# Patient Record
Sex: FEMALE | Race: WHITE | NOT HISPANIC OR LATINO | Employment: FULL TIME | URBAN - METROPOLITAN AREA
[De-identification: names, ages, dates, MRNs, and addresses within clinical notes are randomized per-mention and may not be internally consistent; named-entity substitution may affect disease eponyms.]

---

## 2017-07-07 ENCOUNTER — OFFICE VISIT (OUTPATIENT)
Dept: FAMILY MEDICINE | Facility: CLINIC | Age: 47
End: 2017-07-07

## 2017-07-07 VITALS
SYSTOLIC BLOOD PRESSURE: 112 MMHG | OXYGEN SATURATION: 100 % | HEART RATE: 58 BPM | TEMPERATURE: 97.7 F | BODY MASS INDEX: 21.63 KG/M2 | RESPIRATION RATE: 18 BRPM | DIASTOLIC BLOOD PRESSURE: 77 MMHG | WEIGHT: 132 LBS

## 2017-07-07 DIAGNOSIS — Z12.4 SCREENING FOR CERVICAL CANCER: ICD-10-CM

## 2017-07-07 DIAGNOSIS — R53.83 OTHER FATIGUE: ICD-10-CM

## 2017-07-07 DIAGNOSIS — Z00.00 ROUTINE GENERAL MEDICAL EXAMINATION AT A HEALTH CARE FACILITY: ICD-10-CM

## 2017-07-07 DIAGNOSIS — R35.89 POLYURIA: Primary | ICD-10-CM

## 2017-07-07 DIAGNOSIS — N39.0 URINARY TRACT INFECTION, SITE UNSPECIFIED: ICD-10-CM

## 2017-07-07 LAB
BACTERIA: NORMAL
BACTERIA: NORMAL
BILIRUBIN UR: NEGATIVE
BLOOD UR: ABNORMAL
CASTS: NORMAL /LPF
CHOLEST SERPL-MCNC: 226.1 MG/DL (ref 0–200)
CHOLEST/HDLC SERPL: 3.4 {RATIO} (ref 0–5)
CLUE CELLS: NORMAL
CRYSTAL URINE: NORMAL /LPF
EPITHELIAL CELLS UR: <2 /LPF (ref 0–2)
GLUCOSE URINE: NEGATIVE
HDLC SERPL-MCNC: 67 MG/DL
KETONES UR QL: NEGATIVE
LDLC SERPL CALC-MCNC: 143 MG/DL (ref 0–129)
LEUKOCYTE ESTERASE UR: ABNORMAL
MOTILE TRICHOMONAS: NEGATIVE
MUCOUS URINE: NORMAL LPF
NITRITE UR QL STRIP: NEGATIVE
ODOR: NORMAL
PH UR STRIP: 7 [PH] (ref 5–7)
PH WET PREP: <4.5
PROTEIN UR: NEGATIVE
RBC URINE: NORMAL /HPF
SP GR UR STRIP: 1.01
TRIGL SERPL-MCNC: 82.4 MG/DL (ref 0–150)
TSH SERPL DL<=0.005 MIU/L-ACNC: 1.86 MU/L (ref 0.4–4)
UROBILINOGEN UR STRIP-ACNC: ABNORMAL
VLDL CHOLESTEROL: 16.5 MG/DL (ref 7–32)
WBC URINE: NORMAL /HPF
WBC WET PREP: NORMAL
YEAST: NORMAL

## 2017-07-07 RX ORDER — NITROFURANTOIN 25; 75 MG/1; MG/1
100 CAPSULE ORAL 2 TIMES DAILY
Qty: 14 CAPSULE | Refills: 0 | Status: SHIPPED | OUTPATIENT
Start: 2017-07-07 | End: 2017-08-16

## 2017-07-07 NOTE — PROGRESS NOTES
"  Female Physical Note          HPI         Concerns today:     Patient feels she has an ovarian cyst of the left ovary.  Pain started 2-3 days ago and is described as a sharp shooting pain of her left ovary.  She has had this pain before, but states it was much worse than it usually is.  Any movement aggravated the pain and she almost went to the ED yesterday because of the pain.  She would like a pelvic exam to evaluate if I can feel her ovary or the cyst although pain has actually improved today.  Patient had a hysterectomy but her left ovary and cervix were kept.  She had ASC-H on last pap in 2015.  She had a colposcopy 11/15 and recommendation was to repeat pap and HPV in 1 year.  Patient states she had a pap 1 year prior, but I did not see this in her records.     Bladder is also \"feeling funny\".  Patient states she was having urgency and frequency, but it has now resolved.  Patient also feels fatigued.  Patient is sexually active (no new partner in 17 years).  Patient has not had STI testing in several years.  She is open to STI testing, BV, and UTI testing.  She is also requesting to have her thyroid checked since she feels fatigued and doesn't feel \"100%\".        Patient Active Problem List   Diagnosis     Endometriosis     Recurrent UTI     Abnormal Pap smear of cervix     Pelvic pain in female     Diverticulosis of large intestine without hemorrhage       History reviewed. No pertinent past medical history.    Previous Medical Care        Family History   Problem Relation Age of Onset     Thyroid Disease Mother      Asthma Father      Thyroid Disease Daughter      DIABETES No family hx of      Coronary Artery Disease No family hx of      Hypertension No family hx of      Hyperlipidemia No family hx of      CEREBROVASCULAR DISEASE No family hx of      Breast Cancer No family hx of      Prostate Cancer No family hx of      Colon Cancer No family hx of      Other Cancer No family hx of      Depression No " family hx of      Anxiety Disorder No family hx of      MENTAL ILLNESS No family hx of      Substance Abuse No family hx of      Anesthesia Reaction No family hx of      OSTEOPOROSIS No family hx of      Genetic Disorder No family hx of      Obesity No family hx of      Unknown/Adopted No family hx of               Review of Systems:     Review of Systems:  CONSTITUTIONAL: NEGATIVE for fever, chills, change in weight, POSITIVE for hot flashes  INTEGUMENTARY/SKIN: NEGATIVE for worrisome rashes, moles or lesions  EYES: NEGATIVE for vision changes or irritation  ENT/MOUTH: NEGATIVE for ear, mouth and throat problems  RESP: NEGATIVE for significant cough or SOB  BREAST: NEGATIVE for masses, tenderness or discharge  CV: NEGATIVE for chest pain, palpitations or peripheral edema  GI: NEGATIVE for nausea, abdominal pain, heartburn, or change in bowel habits  : NEGATIVE for frequency, dysuria, or hematuria, OCCASIONAL nausea, OCCASIONAL urinary frequency, urgency  MUSCULOSKELETAL: NEGATIVE for significant arthralgias or myalgia  NEURO: NEGATIVE for weakness, dizziness or paresthesias  ENDOCRINE: NEGATIVE for temperature intolerance, skin/hair changes  HEME/ALLERGY: NEGATIVE for bleeding problems  PSYCHIATRIC: NEGATIVE for changes in mood or affect    Sleep:   Do you snore most or the night (as reported by a family member)? No  Do you feel sleepy or extremely tired during most of the day? No             Social History     Social History     Social History     Marital status:      Spouse name: N/A     Number of children: N/A     Years of education: N/A     Occupational History     Not on file.     Social History Main Topics     Smoking status: Former Smoker     Smokeless tobacco: Never Used     Alcohol use Yes      Comment: 2 drinks once a week     Drug use: No     Sexual activity: Yes     Partners: Male     Birth control/ protection: None     Other Topics Concern     Not on file     Social History Narrative        Marital Status:Single  Who lives in your household? Lives with spouse    Has anyone hurt you physically, for example by pushing, hitting, slapping or kicking you or forcing you to have sex? Denies  Do you feel threatened or controlled by a partner, ex-partner or anyone in your life? Denies    Sexual Health     Sexual concerns: No   STI History: Neg  Pregnancy History:   LMP No LMP recorded. Patient has had a hysterectomy. LMP  (hysterectomy)  Last Pap Smear Date:   Lab Results   Component Value Date    PAP ASC-H 2015     Abnormal Pap History: See problem list    Recommended Screening     Cholesterol Level (>46 yo or at risk):  Recommended and patient accepted testing.   Pap/HPV cotest every 5 years for women 30-65   Recommended and patient accepted testing.    Patient had a mammogram 1 year prior.     Patient also had a colonoscopy 2016.       Physical Exam:     Vitals: /77 (BP Location: Left arm, Patient Position: Chair, Cuff Size: Adult Regular)  Pulse 58  Temp 97.7  F (36.5  C) (Oral)  Resp 18  Wt 132 lb (59.9 kg)  SpO2 100%  Breastfeeding? No  BMI 21.63 kg/m2  BMI= Body mass index is 21.63 kg/(m^2).   GENERAL: healthy, alert and no distress  EYES: Eyes grossly normal to inspection, extraocular movements - intact, and PERRL  HENT: ear canals- normal; TMs- normal; Nose- normal; Mouth- no ulcers, no lesions  NECK: no tenderness, no adenopathy, no asymmetry, no masses, no stiffness; thyroid- normal to palpation  RESP: lungs clear to auscultation - no rales, no rhonchi, no wheezes  BREAST: no masses, no tenderness, no nipple discharge, no palpable axillary masses or adenopathy  CV: regular rates and rhythm, normal S1 S2, no S3 or S4 and no murmur, no click or rub -  ABDOMEN: soft, no tenderness, no  hepatosplenomegaly, no masses, normal bowel sounds  MS: extremities- no gross deformities noted, no edema  SKIN: no suspicious lesions, no rashes  NEURO: strength and tone- normal,  sensory exam- grossly normal, mentation- intact, speech- normal   BACK: no CVA tenderness, no paralumbar tenderness  - female: cervix- normal, adnexae- normal; unable to palpate left ovary  PSYCH: Alert and oriented times 3; speech- coherent , normal rate and volume; able to articulate logical thoughts, able to abstract reason, no tangential thoughts, no hallucinations or delusions, affect- normal  LYMPHATICS: ant. cervical- normal, post. cervical- normal, axillary- normal, supraclavicular- normal, inguinal- normal      Assessment and Plan     Laya was seen today for physical.      Laya's exam was normal.  I was unable to appreciate her left ovary but it did not seem to be bothering her much today.  If it continues to be a problem, I suggested that she return to clinic.  We may consider a pelvic ultrasound if other causes of pelvic pain can be ruled out.    UA showed 1+ LE.  This is not significant, but since patient was having symptoms of a UTI, it seemed reasonable to treat her.  I used Macrobid since this is what she was on in the past and it worked well for her.  I also performed a pelvic exam with STI testing and a pap/HPV test.  Her wet prep was normal, which also supports a UTI as the cause of her symptoms instead of BV or yeast.      Patient requested to have lipids and TSH checked.  She is not due for a mammogram (had a mammogram 1 year prior).      Diagnoses and all orders for this visit:    Polyuria  -     Cancel: UA with Microscopic reflex to Culture  -     Urinalysis, Micro If (UA) ([facility])  -     Urine Microscopic (UA) ([facility])  -     Urine Culture Aerobic Bacterial    Routine general medical examination at a health care facility  -     Lipid [place] ([facility])  -     Neisseria gonorrhoeae PCR  -     Chlamydia trachomatis PCR  -     Wet Prep ([facility])    Screening for cervical cancer  -     Pap imaged thin layer screen with HPV - recommended age 30 - 65 years (select HPV order below)  -      HPV High Risk Types DNA Cervical    Other fatigue  -     TSH with free T4 reflex    Urinary tract infection, site unspecified  -     nitroFURantoin, macrocrystal-monohydrate, (MACROBID) 100 MG capsule; Take 1 capsule (100 mg) by mouth 2 times daily    Options for treatment and follow-up care were reviewed with the patient . Laya FELIPE Hand and/or guardian engaged in the decision making process and verbalized understanding of the options discussed and agreed with the final plan.    Kirsten Cotter M.D.  PGY-3, Family Medicine

## 2017-07-07 NOTE — PROGRESS NOTES
Preceptor Attestation:   Patient seen and discussed with the resident. Assessment and plan reviewed with resident and agreed upon.   Supervising Physician:  Berny Zuniga MD  [facility]

## 2017-07-07 NOTE — MR AVS SNAPSHOT
After Visit Summary   7/7/2017    Laya Sainz    MRN: [id number]           Patient Information     Date Of Birth          1970        Visit Information        Provider Department      7/7/2017 1:20 PM Kirsten Cotter MD [facility]        Today's Diagnoses     Polyuria    -  1    Routine general medical examination at a health care facility        Screening for cervical cancer        Other fatigue        Urinary tract infection, site unspecified          Care Instructions    Here is the plan from today's visit    1. Routine general medical examination at a health care facility  I will call you if your cholesterol is abnormal.  - Lipid Cascade ([facility])    2. Screening for cervical cancer  Pap and HPV performed.  If results are abnormal, we will call you.  If normal, we will send you a letter.    3. Polyuria  You may or may not have a urinary tract infection.  If you continue to have symptoms (urgency, frequency, take antibiotic (sent to pharmacy)  - Urinalysis, Micro If (UA) ([facility])  - [facility]  - Urine Culture Aerobic Bacterial    4. Other fatigue  We are checking your thyroid.  I will let you know if results are abnormal.   - TSH with free T4 reflex    5. Urinary tract infection, site unspecified  - nitroFURantoin, macrocrystal-monohydrate, (MACROBID) 100 MG capsule; Take 1 capsule (100 mg) by mouth 2 times daily  Dispense: 14 capsule; Refill: 0    Please call or return to clinic if your symptoms don't go away.    Follow up plan  Please make a clinic appointment for follow up with me (KIRSTEN COTTER) or your primary physician Barb Lemus as needed    [facility]  Lab Testing:  **If you had lab testing today and your results are reassuring or normal they will be mailed to you or sent through [facility] within 7 days.   **If the lab tests need quick action we will call you with the results.  The  phone number we will call with results is # [phone] (home) . If this is not the best number please call our clinic and change the number.  Medication Refills:  If you need any refills please call your pharmacy and they will contact us.   If you need to  your refill at a new pharmacy, please contact the new pharmacy directly. The new pharmacy will help you get your medications transferred faster.   Scheduling:  If you have any concerns about today's visit or wish to schedule another appointment please call our office during normal business hours [phone] (8-5:00 M-F)  If a referral was made to a [facility] and you don't get a call from central scheduling please call [phone].  If a Mammogram was ordered for you at [facility] call [phone] to schedule or change your appointment.  If you had an XRay/CT/Ultrasound/MRI ordered the number is [phone] to schedule or change your radiology appointment.   Medical Concerns:  If you have urgent medical concerns please call [phone] at any time of the day.        Preventive Health Recommendations  Female Ages 40 to 49    Yearly exam:     See your health care provider every year in order to  1. Review health changes.   2. Discuss preventive care.    3. Review your medicines if your doctor prescribed any.      Get a Pap test every three years (unless you have an abnormal result and your provider advises testing more often).      If you get Pap tests with HPV test, you only need to test every 5 years, unless you have an abnormal result. You do not need a Pap test if your uterus was removed (hysterectomy) and you have not had cancer.      You should be tested each year for STDs (sexually transmitted diseases), if you're at risk.       Ask your doctor if you should have a mammogram.      Have a colonoscopy (test for colon cancer) if someone in your family has had colon cancer or polyps before age 50.       Have a  cholesterol test every 5 years.       Have a diabetes test (fasting glucose) after age 45. If you are at risk for diabetes, you should have this test every 3 years.    Shots: Get a flu shot each year. Get a tetanus shot every 10 years.     Nutrition:     Eat at least 5 servings of fruits and vegetables each day.    Eat whole-grain bread, whole-wheat pasta and brown rice instead of white grains and rice.    Talk to your provider about Calcium and Vitamin D.     Lifestyle    Exercise at least 150 minutes a week (an average of 30 minutes a day, 5 days a week). This will help you control your weight and prevent disease.    Limit alcohol to one drink per day.    No smoking.     Wear sunscreen to prevent skin cancer.    See your dentist every six months for an exam and cleaning.          Follow-ups after your visit        Who to contact     Please call your clinic at [phone] to:    Ask questions about your health    Make or cancel appointments    Discuss your medicines    Learn about your test results    Speak to your doctor   If you have compliments or concerns about an experience at your clinic, or if you wish to file a complaint, please contact [facility] Patient Relations at [phone] or email us at [email or web address]         Additional Information About Your Visit        [employer] Information     [employer] gives you secure access to your electronic health record. If you see a primary care provider, you can also send messages to your care team and make appointments. If you have questions, please call your primary care clinic.  If you do not have a primary care provider, please call [phone] and they will assist you.      [employer] is an electronic gateway that provides easy, online access to your medical records. With [employer], you can request a clinic appointment, read your test results, renew a prescription or communicate with your care team.     To access your existing  account, please contact your [facility] or call [phone] for assistance.        Care EveryWhere ID     This is your Care EveryWhere ID. This could be used by other organizations to access your [facility] medical records  [id number]        Your Vitals Were     Pulse Temperature Respirations Pulse Oximetry Breastfeeding? BMI (Body Mass Index)    58 97.7  F (36.5  C) (Oral) 18 100% No 21.63 kg/m2       Blood Pressure from Last 3 Encounters:   07/07/17 112/77   01/11/16 106/75   11/10/15 123/82    Weight from Last 3 Encounters:   07/07/17 132 lb (59.9 kg)   11/10/15 130 lb 3.2 oz (59.1 kg)   09/28/15 131 lb 9.6 oz (59.7 kg)              We Performed the Following     Lipid [place] ([facility])     TSH with free T4 reflex     Urinalysis, Micro If (UA) ([facility])     Urine Culture Aerobic Bacterial     Urine Microscopic (UA) ([facility])          Today's Medication Changes          These changes are accurate as of: 7/7/17  2:46 PM.  If you have any questions, ask your nurse or doctor.               These medicines have changed or have updated prescriptions.        Dose/Directions    * nitroFURantoin (macrocrystal-monohydrate) 100 MG capsule   Commonly known as:  MACROBID   This may have changed:  Another medication with the same name was added. Make sure you understand how and when to take each.   Used for:  Recurrent UTI   Changed by:  Carolynn Kwong MD        Dose:  100 mg   Take 1 capsule (100 mg) by mouth daily as needed   Quantity:  30 capsule   Refills:  2       * nitroFURantoin (macrocrystal-monohydrate) 100 MG capsule   Commonly known as:  MACROBID   This may have changed:  You were already taking a medication with the same name, and this prescription was added. Make sure you understand how and when to take each.   Used for:  Urinary tract infection, site unspecified   Changed by:  Kirstne Cotter MD        Dose:  100 mg   Take 1 capsule (100 mg) by mouth 2 times daily    Quantity:  14 capsule   Refills:  0       * Notice:  This list has 2 medication(s) that are the same as other medications prescribed for you. Read the directions carefully, and ask your doctor or other care provider to review them with you.         Where to get your medicines      These medications were sent to [facility] [id number] [address] AT [facility] & [address] [place] [id number]    Hours:  24-hours Phone:  [phone]     nitroFURantoin (macrocrystal-monohydrate) 100 MG capsule                Primary Care Provider Office Phone # Fax #    Barb Lemus [phone]       [facility] [address]   [facility] [address]        Equal Access to Services     INDRA GOMEZ AH: Hadii josep denise hadasho Soleticia, waaxda luqadaha, qaybta kaalmada adeegyada, oksana marrufo . So [facility] [phone].    ATENCIÓN: Si habla español, tiene a he disposición servicios gratuitos de asistencia lingüística. Darya al [phone].    We comply with applicable federal civil rights laws and [place] laws. We do not discriminate on the basis of race, color, national origin, age, disability sex, sexual orientation or gender identity.            Thank you!     Thank you for choosing [facility]  for your care. Our goal is always to provide you with excellent care. Hearing back from our patients is one way we can continue to improve our services. Please take a few minutes to complete the written survey that you may receive in the mail after your visit with us. Thank you!             Your Updated Medication List - Protect others around you: Learn how to safely use, store and throw away your medicines at www.disposemymeds.org.          This list is accurate as of: 7/7/17  2:46 PM.  Always use your most recent med list.                   Brand Name Dispense Instructions for use Diagnosis     loratadine 10 MG tablet    CLARITIN     Take 10 mg by mouth daily        mometasone 50 MCG/ACT spray    NASONEX     Spray 2 sprays into both nostrils daily        * nitroFURantoin (macrocrystal-monohydrate) 100 MG capsule    MACROBID    30 capsule    Take 1 capsule (100 mg) by mouth daily as needed    Recurrent UTI       * nitroFURantoin (macrocrystal-monohydrate) 100 MG capsule    MACROBID    14 capsule    Take 1 capsule (100 mg) by mouth 2 times daily    Urinary tract infection, site unspecified       triamcinolone 0.1 % ointment    KENALOG    30 g    Apply sparingly to affected area three times daily for 14 days.    Contact dermatitis and other eczema, due to unspecified cause       * Notice:  This list has 2 medication(s) that are the same as other medications prescribed for you. Read the directions carefully, and ask your doctor or other care provider to review them with you.

## 2017-07-07 NOTE — PATIENT INSTRUCTIONS
Here is the plan from today's visit    1. Routine general medical examination at a health care facility  I will call you if your cholesterol is abnormal.  - Lipid Cascade ([facility])    2. Screening for cervical cancer  Pap and HPV performed.  If results are abnormal, we will call you.  If normal, we will send you a letter.    3. Polyuria  You may or may not have a urinary tract infection.  If you continue to have symptoms (urgency, frequency, take antibiotic (sent to pharmacy)  - Urinalysis, Micro If (UA) ([facility])  - Urine Microscopic (UA) ([facility])  - Urine Culture Aerobic Bacterial    4. Other fatigue  We are checking your thyroid.  I will let you know if results are abnormal.   - TSH with free T4 reflex    5. Urinary tract infection, site unspecified  - nitroFURantoin, macrocrystal-monohydrate, (MACROBID) 100 MG capsule; Take 1 capsule (100 mg) by mouth 2 times daily  Dispense: 14 capsule; Refill: 0    Please call or return to clinic if your symptoms don't go away.    Follow up plan  Please make a clinic appointment for follow up with me (DARRYL CADENA) or your primary physician Barb Lemus as needed    Thank you for coming to [facility] today.  Lab Testing:  **If you had lab testing today and your results are reassuring or normal they will be mailed to you or sent through "[employer] within 7 days.   **If the lab tests need quick action we will call you with the results.  The phone number we will call with results is # [phone] (home) . If this is not the best number please call our clinic and change the number.  Medication Refills:  If you need any refills please call your pharmacy and they will contact us.   If you need to  your refill at a new pharmacy, please contact the new pharmacy directly. The new pharmacy will help you get your medications transferred faster.   Scheduling:  If you have any concerns about today's visit or wish to schedule another appointment please call our  office during normal business hours [phone] (8-5:00 M-F)  If a referral was made to a [facility] and you don't get a call from central scheduling please call [phone].  If a Mammogram was ordered for you at [facility] call [phone] to schedule or change your appointment.  If you had an XRay/CT/Ultrasound/MRI ordered the number is [phone] to schedule or change your radiology appointment.   Medical Concerns:  If you have urgent medical concerns please call [phone] at any time of the day.        Preventive Health Recommendations  Female Ages 40 to 49    Yearly exam:     See your health care provider every year in order to  1. Review health changes.   2. Discuss preventive care.    3. Review your medicines if your doctor prescribed any.      Get a Pap test every three years (unless you have an abnormal result and your provider advises testing more often).      If you get Pap tests with HPV test, you only need to test every 5 years, unless you have an abnormal result. You do not need a Pap test if your uterus was removed (hysterectomy) and you have not had cancer.      You should be tested each year for STDs (sexually transmitted diseases), if you're at risk.       Ask your doctor if you should have a mammogram.      Have a colonoscopy (test for colon cancer) if someone in your family has had colon cancer or polyps before age 50.       Have a cholesterol test every 5 years.       Have a diabetes test (fasting glucose) after age 45. If you are at risk for diabetes, you should have this test every 3 years.    Shots: Get a flu shot each year. Get a tetanus shot every 10 years.     Nutrition:     Eat at least 5 servings of fruits and vegetables each day.    Eat whole-grain bread, whole-wheat pasta and brown rice instead of white grains and rice.    Talk to your provider about Calcium and Vitamin D.     Lifestyle    Exercise at least 150 minutes a week (an average of 30  minutes a day, 5 days a week). This will help you control your weight and prevent disease.    Limit alcohol to one drink per day.    No smoking.     Wear sunscreen to prevent skin cancer.    See your dentist every six months for an exam and cleaning.

## 2017-07-08 LAB
BACTERIA SPEC CULT: NO GROWTH
Lab: NORMAL
MICRO REPORT STATUS: NORMAL
SPECIMEN SOURCE: NORMAL

## 2017-07-09 LAB
C TRACH DNA SPEC QL NAA+PROBE: NORMAL
N GONORRHOEA DNA SPEC QL NAA+PROBE: NORMAL
SPECIMEN SOURCE: NORMAL
SPECIMEN SOURCE: NORMAL

## 2017-07-12 LAB
COPATH REPORT: ABNORMAL
PAP: ABNORMAL

## 2017-07-13 LAB
FINAL DIAGNOSIS: NORMAL
HPV HR 12 DNA CVX QL NAA+PROBE: NEGATIVE
HPV16 DNA SPEC QL NAA+PROBE: NEGATIVE
HPV18 DNA SPEC QL NAA+PROBE: NEGATIVE
SPECIMEN DESCRIPTION: NORMAL

## 2017-07-19 ENCOUNTER — TELEPHONE (OUTPATIENT)
Dept: FAMILY MEDICINE | Facility: CLINIC | Age: 47
End: 2017-07-19

## 2017-07-19 DIAGNOSIS — R87.610 PAPANICOLAOU SMEAR OF CERVIX WITH ATYPICAL SQUAMOUS CELLS OF UNDETERMINED SIGNIFICANCE (ASC-US): Primary | ICD-10-CM

## 2017-07-19 NOTE — TELEPHONE ENCOUNTER
Called patient to schedule colposcopy. No answer, left voicemail.    What procedure: colposcopy  Urgency of Appointment: Next Available  Would this patient benefit from pre-medication with Ativan for procedural anxiety? No  Is this patient post-menopausal? Not sure

## 2017-07-19 NOTE — TELEPHONE ENCOUNTER
Called patient and LVM regarding test results, asking patient to call back.  If patient calls back, please let her know that she will need to schedule a colposcopy for her recent pap.  The pap showed ASCUS.  This is a lower grade lesion that what she had before ASC-H, but she will still need a colposcopy.  I have put the referral in for the colposcopy.  If she has further questions, please leave me a message and I will call her back.      Thanks,  Kirsten Cotter M.D.  PGY-3, Family Medicine

## 2017-08-15 ENCOUNTER — OFFICE VISIT (OUTPATIENT)
Dept: FAMILY MEDICINE | Facility: CLINIC | Age: 47
End: 2017-08-15

## 2017-08-15 VITALS
SYSTOLIC BLOOD PRESSURE: 119 MMHG | OXYGEN SATURATION: 100 % | HEART RATE: 66 BPM | RESPIRATION RATE: 18 BRPM | TEMPERATURE: 98 F | WEIGHT: 132 LBS | BODY MASS INDEX: 21.63 KG/M2 | DIASTOLIC BLOOD PRESSURE: 74 MMHG

## 2017-08-15 DIAGNOSIS — R10.2 PELVIC PAIN IN FEMALE: ICD-10-CM

## 2017-08-15 DIAGNOSIS — N39.0 RECURRENT UTI: ICD-10-CM

## 2017-08-15 DIAGNOSIS — R87.610 PAPANICOLAOU SMEAR OF CERVIX WITH ATYPICAL SQUAMOUS CELLS OF UNDETERMINED SIGNIFICANCE (ASC-US): ICD-10-CM

## 2017-08-15 DIAGNOSIS — Z87.440 HISTORY OF UTI: Primary | ICD-10-CM

## 2017-08-15 DIAGNOSIS — Z13.9 SCREENING FOR CONDITION: ICD-10-CM

## 2017-08-15 DIAGNOSIS — N80.9 ENDOMETRIOSIS: ICD-10-CM

## 2017-08-15 LAB
BACTERIA: NORMAL
BILIRUBIN UR: NEGATIVE
BLOOD UR: ABNORMAL
CASTS: NORMAL /LPF
CRYSTAL URINE: NORMAL /LPF
EPITHELIAL CELLS UR: NORMAL /LPF (ref 0–2)
GLUCOSE URINE: NEGATIVE
HCG UR QL: NEGATIVE
KETONES UR QL: NEGATIVE
LEUKOCYTE ESTERASE UR: ABNORMAL
MUCOUS URINE: NORMAL LPF
NITRITE UR QL STRIP: NEGATIVE
PH UR STRIP: 7.5 [PH] (ref 5–7)
PROTEIN UR: NEGATIVE
RBC URINE: NORMAL /HPF
SP GR UR STRIP: 1.02
UROBILINOGEN UR STRIP-ACNC: ABNORMAL
WBC URINE: NORMAL /HPF

## 2017-08-15 NOTE — PATIENT INSTRUCTIONS
You had a colposcopy today in order to have a closer look at your cervix to figure out why your testing (pap smear, HPV or exam) was not normal.      If we took some biopsies:  --- you will have some spotting for the next few days.  The spotting will most likely be brown/black in color but might have some red in it.  This is normal.  Please call right away if you have bleeding that is like a menstrual period.      --- you will also have some cramping. The cramping should be mild and be better by the end of the day.  Ibuprofen, 400 - 600 mg every 6 hours is helpful (make sure that you are OK to take ibuprofen).     --- we recommend that you do not have vaginal intercourse, use tampons or douche in the next week. This assures that the biopsies heal safely.    --- you will get a letter and/or a call in the next week with the results of your biopsy.  If they show moderate or severe dysplasia (changes in the cells), we will most likely recommend treatment. Treatment can be either cryotherapy (freezing of the cervix) or LEEP (loop electrical excision of the affected area of your cervix), based on characteristics of the biopsy and how much of the cervix has the dysplasia.  Your provider will help guide you on which treatment is best for you.

## 2017-08-15 NOTE — MR AVS SNAPSHOT
After Visit Summary   8/15/2017    Laya Sainz    MRN: [id number]           Patient Information     Date Of Birth          1970        Visit Information        Provider Department      8/15/2017 3:00 PM Barb Lemus MD [facility]        Today's Diagnoses     History of UTI    -  1    Encounter for routine gynecological examination        Screening for condition          Care Instructions    You had a colposcopy today in order to have a closer look at your cervix to figure out why your testing (pap smear, HPV or exam) was not normal.      If we took some biopsies:  --- you will have some spotting for the next few days.  The spotting will most likely be brown/black in color but might have some red in it.  This is normal.  Please call right away if you have bleeding that is like a menstrual period.      --- you will also have some cramping. The cramping should be mild and be better by the end of the day.  Ibuprofen, 400 - 600 mg every 6 hours is helpful (make sure that you are OK to take ibuprofen).     --- we recommend that you do not have vaginal intercourse, use tampons or douche in the next week. This assures that the biopsies heal safely.    --- you will get a letter and/or a call in the next week with the results of your biopsy.  If they show moderate or severe dysplasia (changes in the cells), we will most likely recommend treatment. Treatment can be either cryotherapy (freezing of the cervix) or LEEP (loop electrical excision of the affected area of your cervix), based on characteristics of the biopsy and how much of the cervix has the dysplasia.  Your provider will help guide you on which treatment is best for you.               Follow-ups after your visit        Who to contact     Please call your clinic at [phone] to:    Ask questions about your health    Make or cancel appointments    Discuss your medicines    Learn about your test results    Speak to your  doctor   If you have compliments or concerns about an experience at your clinic, or if you wish to file a complaint, please contact [facility] Patient Relations at [phone] or email us at [email or web address]         Additional Information About Your Visit        MyChart Information     [facility] [employer] gives you secure access to your electronic health record. If you see a primary care provider, you can also send messages to your care team and make appointments. If you have questions, please call your primary care clinic.  If you do not have a primary care provider, please call [phone] and they will assist you.      [facility] is an electronic gateway that provides easy, online access to your medical records. With [employer], you can request a clinic appointment, read your test results, renew a prescription or communicate with your care team.     To access your existing account, please contact your [facility] Clinic or call [phone] for assistance.        Care EveryWhere ID     This is your Care EveryWhere ID. This could be used by other organizations to access your [place] medical records  [id number]        Your Vitals Were     Pulse Temperature Respirations Pulse Oximetry Breastfeeding? BMI (Body Mass Index)    66 98  F (36.7  C) (Oral) 18 100% No 21.63 kg/m2       Blood Pressure from Last 3 Encounters:   08/15/17 119/74   07/07/17 112/77   01/11/16 106/75    Weight from Last 3 Encounters:   08/15/17 132 lb (59.9 kg)   07/07/17 132 lb (59.9 kg)   11/10/15 130 lb 3.2 oz (59.1 kg)              We Performed the Following     HCG Qualitative Urine (UPT) ([facility])     Urinalysis, Micro If ([facility])     Urine Culture Aerobic Bacterial     Urine Microscopic (UA) ([facility])        Primary Care Provider Office Phone # Fax #    Barb Lemus [phone]       [address] [address]        Equal Access to Services      [facility]: Hadii aad ku keyona Wu, waaxda luqadaha, qaybta kaalmada adeegchelsie, oksana stewartin hayaatessie mcadamsayaka das niru . So [facility] [phone].    ATENCIÓN: Si kesha isabel, tiene a he disposición servicios gratuitos de asistencia lingüística. Llame al [phone].    We comply with applicable federal civil rights laws and [place] laws. We do not discriminate on the basis of race, color, national origin, age, disability sex, sexual orientation or gender identity.            Thank you!     Thank you for choosing [facility]  for your care. Our goal is always to provide you with excellent care. Hearing back from our patients is one way we can continue to improve our services. Please take a few minutes to complete the written survey that you may receive in the mail after your visit with us. Thank you!             Your Updated Medication List - Protect others around you: Learn how to safely use, store and throw away your medicines at www.disposemymeds.org.          This list is accurate as of: 8/15/17  3:51 PM.  Always use your most recent med list.                   Brand Name Dispense Instructions for use Diagnosis    loratadine 10 MG tablet    CLARITIN     Take 10 mg by mouth daily        mometasone 50 MCG/ACT spray    NASONEX     Spray 2 sprays into both nostrils daily        * nitroFURantoin (macrocrystal-monohydrate) 100 MG capsule    MACROBID    30 capsule    Take 1 capsule (100 mg) by mouth daily as needed    Recurrent UTI       * nitroFURantoin (macrocrystal-monohydrate) 100 MG capsule    MACROBID    14 capsule    Take 1 capsule (100 mg) by mouth 2 times daily    Urinary tract infection, site unspecified       triamcinolone 0.1 % ointment    KENALOG    30 g    Apply sparingly to affected area three times daily for 14 days.    Contact dermatitis and other eczema, due to unspecified cause       * Notice:  This list has 2 medication(s) that are the same as other medications  prescribed for you. Read the directions carefully, and ask your doctor or other care provider to review them with you.

## 2017-08-15 NOTE — PROGRESS NOTES
[facility]   Colposcopy Procedure Note    History:  Laya Sainz is a patient of Barb Leung that  presents for colposcopy for ASCUS, HPV negative.  Had colposcopy performed 11/10/2015 after abnormal pap showing ASC-H (no HPV testing).     STI history: none  Contraception  None; Hysterectomy in 2009. Still has cervix and left ovary  Smoking?  No  Taking folate?   No  ASA in last week? No  NSAID taken today? Yes, 2 tablets at 14:15    Consent: Affirmation of informed consent signed and scanned into medical record. Risks, benefits and alternatives discussed. Patient's questions were elicited and answered.  Procedure safety checklist was completed:  Yes  Time Out (Pause for the Cause) completed: Yes    Labs:   UPT:  Negative    Procedure:  Patient positioned for colposcopy. Acetic acid applied. Lugol's applied.   SCJ seen entirely? Yes, slightly obscured by multiple nabothian cysts  [place] was satisfactory with biopsy at 12 o'clock position  Cervix swabbed with acetic acid and Lugol's solution. Multiple nabothian cysts visualized making interpretation of solution uptake slightly difficult. SCJ visualized - lesion at 12 o o'clock, cervical biopsies taken at 12 o'clock, specimen labelled and sent to pathology and hemostasis achieved with Monsel's solution  Bleeding controlled with: pressure and Monsel's solution  EBL: 2cc     Findings:   Vagina   vaginal colposcopy not performed    Vulva  vulvar colposcopy not performed    Cervix:   Overall she has a lot of nabothian cysts (like last colpo) with one for sure at 7, 5 and likely at 11 and 2.  Due to her cystic cervix, the topography and color is difficult to interpret.  Most of the changes seen a vessel changes that appear to be over cysts. There were no discreet AWE areas. Biopsied at 12:00 due to some vessel changes there. Limited biopsies since negative x 4 with ASC- H 2 years ago and her HPV is negative.       Colposcopic Impression: Dysplasia Mild      Tolerance:   Patient tolerated procedure well with some pain during procedure, but was much better at end of visit.    Plan:  Follow up in 2 weeks for biopsy results.  Discharge instructions discussed including RTC or call if bleeding >1pph, fever, abdominal pain or foul smelling discharge.       Resident: Leatha Smith MD PGY2  Faculty: Barb Lemus MD present for and supervised this entire procedure

## 2017-08-16 PROBLEM — K58.2 IRRITABLE BOWEL SYNDROME WITH BOTH CONSTIPATION AND DIARRHEA: Status: ACTIVE | Noted: 2017-08-16

## 2017-08-16 PROBLEM — N30.10 INTERSTITIAL CYSTITIS: Status: ACTIVE | Noted: 2017-08-16

## 2017-08-16 LAB
BACTERIA SPEC CULT: NO GROWTH
Lab: NORMAL
SPECIMEN SOURCE: NORMAL

## 2017-08-16 NOTE — PROGRESS NOTES
MITALI       Laya is a 47 year old  female  who presents for the new concern(s) of:    Chief Complaint   Patient presents with     Colposcopy    and intermittent bladder symptoms and pelvic pain.   Has known diagnosis if IBS and interstitial cystitis, along with past history of endometriosis. S/p hysterectomy where by her report, required the surgeon to peel off the endometrial tissue from multiple sites.  One ovary left.  Is noting more pelvic pain with time that is sharp and lancing - in her tail bone and then shoots to where she feels her left ovary. Also at times feels like her cervix is dilating.  Wondering why pain is worse when she is likely perimenopausal.  Expecting it to get better.   Also notes intermitten bladder symptoms. Several years ago, was treating herself about 60 times a year with Bactrim post intercourse and has now decreased that to 4 or so times a year.  Recently with what felt to her like UTI symptoms with Macrobid course.  UCx negative. UA positive (micro negative) at the time. Felt better after treatment. Drinks a lot of coffee               Patient Active Problem List   Diagnosis     Endometriosis     Recurrent UTI     Abnormal Pap smear of cervix     Pelvic pain in female     Diverticulosis of large intestine without hemorrhage       Current Outpatient Prescriptions   Medication Sig Dispense Refill     mometasone (NASONEX) 50 MCG/ACT nasal spray Spray 2 sprays into both nostrils daily       loratadine (CLARITIN) 10 MG tablet Take 10 mg by mouth daily       nitroFURantoin, macrocrystal-monohydrate, (MACROBID) 100 MG capsule Take 1 capsule (100 mg) by mouth 2 times daily (Patient not taking: Reported on 8/15/2017) 14 capsule 0     nitrofurantoin, macrocrystal-monohydrate, (MACROBID) 100 MG capsule Take 1 capsule (100 mg) by mouth daily as needed (Patient not taking: Reported on 7/7/2017) 30 capsule 2     triamcinolone (KENALOG) 0.1 % ointment Apply sparingly to affected area three  times daily for 14 days. (Patient not taking: Reported on 7/7/2017) 30 g 0        No Known Allergies     Results from the last 24 hours  Results for orders placed or performed in visit on 08/15/17 (from the past 24 hour(s))   HCG Qualitative Urine (UPT) ([facility])   Result Value Ref Range    HCG Qual Urine NEGATIVE Negative   Urine Culture Aerobic Bacterial   Result Value Ref Range    Specimen Description Unspecified Urine     Special Requests Specimen received in preservative     Culture Micro PENDING    Urinalysis, Micro If ([facility])   Result Value Ref Range    Specific Gravity Urine 1.020 1.005 - 1.030    pH Urine 7.5 4.5 - 8.0    Leukocyte Esterase UR Trace (A) NEGATIVE    Nitrite Urine Negative NEGATIVE    Protein UR Negative NEGATIVE    Glucose Urine Negative NEGATIVE    Ketones Urine Negative NEGATIVE    Urobilinogen mg/dL 0.2 E.U./dL 0.2 E.U./dL    Bilirubin UR Negative NEGATIVE    Blood UR Trace-intact (A) NEGATIVE   Urine Microscopic (UA) ([facility])   Result Value Ref Range    WBC Urine None <5 /hpf    RBC Urine None <5 /hpf    Epithelial Cells UR None 0 - 2 /lpf    Mucous Urine None NONE lpf    Casts Urine None NONE /lpf    Crystal Urine None NONE /lpf    Bacteria Wet Prep None None            Review of Systems:               Physical Exam:     Vitals:    08/15/17 1459   BP: 119/74   Pulse: 66   Resp: 18   Temp: 98  F (36.7  C)   TempSrc: Oral   SpO2: 100%   Weight: 132 lb (59.9 kg)     Body mass index is 21.63 kg/(m^2).  Vitals were reviewed and were normal      Office Visit on 07/07/2017   Component Date Value Ref Range Status     TSH 07/07/2017 1.86  0.40 - 4.00 mU/L Final     Cholesterol 07/07/2017 226.1* 0.0 - 200.0 mg/dL Final     Cholesterol/HDL Ratio 07/07/2017 3.4  0.0 - 5.0 Final     HDL Cholesterol 07/07/2017 67.0  >40.0 mg/dL Final     LDL Cholesterol Calculated 07/07/2017 143* 0 - 129 mg/dL Final     Triglycerides 07/07/2017 82.4  0.0 - 150.0 mg/dL Final     VLDL Cholesterol 07/07/2017 16.5   7.0 - 32.0 mg/dL Final     Specific Gravity Urine 07/07/2017 1.015  1.005 - 1.030 Final     pH Urine 07/07/2017 7.0  4.5 - 8.0 Final     Leukocyte Esterase UR 07/07/2017 1+* NEGATIVE Final     Nitrite Urine 07/07/2017 Negative  NEGATIVE Final     Protein UR 07/07/2017 Negative  NEGATIVE Final     Glucose Urine 07/07/2017 Negative  NEGATIVE Final     Ketones Urine 07/07/2017 Negative  NEGATIVE Final     Urobilinogen mg/dL 07/07/2017 0.2 E.U./dL  0.2 E.U./dL Final     Bilirubin UR 07/07/2017 Negative  NEGATIVE Final     Blood UR 07/07/2017 Trace-intact* NEGATIVE Final     WBC Urine 07/07/2017 None  <5 /hpf Final     RBC Urine 07/07/2017 None  <5 /hpf Final     Epithelial Cells UR 07/07/2017 <2  0 - 2 /lpf Final     Mucous Urine 07/07/2017 None  NONE lpf Final     Casts Urine 07/07/2017 None  NONE /lpf Final     Crystal Urine 07/07/2017 None  NONE /lpf Final     Bacteria Wet Prep 07/07/2017 None  None Final     Specimen Description 07/07/2017 Unspecified Urine   Final     Special Requests 07/07/2017 Specimen received in preservative   Final     Culture Micro 07/07/2017 No growth   Final     Micro Report Status 07/07/2017 FINAL 07/08/2017   Final     PAP 07/07/2017 ASC-US*  Final     Copath Report 07/07/2017    Final                    Value:  Acc#: [id number]   Signed: 7/12/2017 09:10   MR#: [id number]    SPECIMEN/STAIN PROCESS:  Pap imaged thin layer prep screening (Surepath, FocalPoint with guided  screening)       Pap-Cyto x 1, HPV ordered x 1    SOURCE: Cervical, endocervical  ----------------------------------------------------------------   Pap imaged thin layer prep screening (Surepath, FocalPoint with guided  screening)  SPECIMEN ADEQUACY:  Satisfactory for evaluation.  -Transformation zone component present.    CYTOLOGIC INTERPRETATION:    Epithelial cell abnormality:  squamous cell:  atypical squamous cells-of  undetermined significance (ASC-US).    Electronically signed by: Rick Amin M.D., PhD    CLINICAL  HISTORY:    Complete Hysterectomy, Previous abnormal pap: ASC-H  Date of Last Pap: 9/28/15,    Papanicolaou Test Limitations:  Cervical cytology is a screening test  with limited sensitivity; regular screening is critical for cancer  prevention; Pap tests are primarily effective for the  diagnosis/preventio                          n of squamous cell carcinoma, not adenocarcinomas or  other cancers.  TESTING LAB LOCATION:  [address] [address], [phone]  Processed and screened at [facility]       HPV 16 DNA 07/07/2017 Negative  NEG Final     HPV 18 DNA 07/07/2017 Negative  NEG Final     Other HR HPV 07/07/2017 Negative  NEG Final     Final Diagnosis 07/07/2017    Final                    Value:This patient's sample is negative for HPV DNA.  (Note)  METHODOLOGY:  The Roche spring 4800 system uses automated extraction,  simultaneous amplification of HPV (L1 region) and beta-globin,  followed by  real time detection of fluorescent labeled HPV and beta  globin using specific oligonucleotide probes . The test specifically  identifies types HPV 16 DNA and HPV 18 DNA while concurrently  detecting the rest of the high risk types (31, 33, 35, 39, 45, 51,  52, 56, 58, 59, 66 or 68).    COMMENTS:  This test is not intended for use as a screening device  for women under age 30 with normal cervical cytology.  Results should  be correlated with cytologic and histologic findings. Close clinical  followup is recommended.    This test was developed and its performance characteristics  determined by the [facility]. It has not been cleared or approved by the  FDA. The laboratory is regulated under CLIA as qualified to perform  high-                          complexity testing. This test is used for clinical purposes. It  should not  be regarded as investigational or for research.         Specimen Description 07/07/2017    Final                    Value:Cervical Cells  [id number]       Specimen Descrip 07/07/2017 Cervical   Final     N Gonorrhea PCR 07/07/2017   NEG Final                    Value:Negative   Negative for N. gonorrhoeae rRNA by transcription mediated amplification.   A negative result by transcription mediated amplification does not preclude the   presence of N. gonorrhoeae infection because results are dependent on proper   and adequate collection, absence of inhibitors, and sufficient rRNA to be   detected.       Specimen Description 07/07/2017 Cervical   Final     Chlamydia Trachomatis PCR 07/07/2017   NEG Final                    Value:Negative   Negative for C. trachomatis rRNA by transcription mediated amplification.   A negative result by transcription mediated amplification does not preclude the   presence of C. trachomatis infection because results are dependent on proper   and adequate collection, absence of inhibitors, and sufficient rRNA to be   detected.       Yeast Wet Prep 07/07/2017 None  none Final     Motile Trichomonas Wet Prep 07/07/2017 Negative  Negative Final     Clue Cells Wet Prep 07/07/2017 None  NONE Final     WBC WET PREP 07/07/2017 None  2 - 5 Final     Bacteria Wet Prep 07/07/2017 None  None Final     pH Wet Prep 07/07/2017 <4.5  3.8 - 4.5 Final     Odor Wet Prep 07/07/2017 None  NONE Final         Assessment and Plan     Laya was seen today for colposcopy.    Diagnoses and all orders for this visit:    History of UTI - discussion that her UTI symptoms are not due to infection (DNA probe negative too recently) and that likely from interstitial cystitis and/or scarring from prior endometriosis.  Strongly encouraged her to limit antibiotics and also to think through dietary changes that might help.   -     Urine Culture Aerobic Bacterial  -     Urinalysis, Micro If ([facility])  -     Urine Microscopic  (UA) ([facility])    Papanicolaou smear of cervix with atypical squamous cells of undetermined significance (ASC-US) - see colpo report. Likely OK.  Most likely will repeat pap and HPV in 1 year.   -     Surgical pathology exam  -     COLP CERVIX/UPPER VAGINA W BX CERVIX (COLPOSCOPY)    Pelvic pain in female - wonder if has residual endometrial tissue if not currently post menopausal.  Past US was negative.  Could well be scar. Sounds like she doesn't want to do anything to treat it but very anxious that we have not determined the cause.   -     MR Pelvis w/o & w Contrast; Future    Endometriosis  -     MR Pelvis w/o & w Contrast; Future        Medications Discontinued During This Encounter   Medication Reason     nitroFURantoin, macrocrystal-monohydrate, (MACROBID) 100 MG capsule        Options for treatment and follow-up care were reviewed with the patient . Laya Sainz  engaged in the decision making process and verbalized understanding of the options discussed and agreed with the final plan.    Barb Lemus MD

## 2017-08-16 NOTE — PROGRESS NOTES
Preceptor Attestation:   Patient seen and discussed with the resident. Present for the entire procedure.  Assessment and plan reviewed with resident and agreed upon.   Supervising Physician:  [facility]

## 2017-08-17 ENCOUNTER — TELEPHONE (OUTPATIENT)
Dept: FAMILY MEDICINE | Facility: CLINIC | Age: 47
End: 2017-08-17

## 2017-08-21 LAB — COPATH REPORT: NORMAL

## 2017-09-12 ENCOUNTER — TELEPHONE (OUTPATIENT)
Dept: FAMILY MEDICINE | Facility: CLINIC | Age: 47
End: 2017-09-12

## 2017-09-12 ENCOUNTER — MYC MEDICAL ADVICE (OUTPATIENT)
Dept: FAMILY MEDICINE | Facility: CLINIC | Age: 47
End: 2017-09-12

## 2017-10-03 ENCOUNTER — OFFICE VISIT (OUTPATIENT)
Dept: FAMILY MEDICINE | Facility: CLINIC | Age: 47
End: 2017-10-03

## 2017-10-03 VITALS
DIASTOLIC BLOOD PRESSURE: 72 MMHG | WEIGHT: 131 LBS | OXYGEN SATURATION: 99 % | RESPIRATION RATE: 16 BRPM | TEMPERATURE: 97.6 F | SYSTOLIC BLOOD PRESSURE: 109 MMHG | HEART RATE: 61 BPM | BODY MASS INDEX: 21.47 KG/M2

## 2017-10-03 DIAGNOSIS — Z23 IMMUNIZATION DUE: ICD-10-CM

## 2017-10-03 DIAGNOSIS — R10.2 PELVIC PAIN IN FEMALE: ICD-10-CM

## 2017-10-03 DIAGNOSIS — R93.89 ABNORMAL MRI: Primary | ICD-10-CM

## 2017-10-03 ASSESSMENT — ENCOUNTER SYMPTOMS
CHILLS: 0
FEVER: 0
UNEXPECTED WEIGHT CHANGE: 0

## 2017-10-03 NOTE — NURSING NOTE
Laya Sainz      1.  Has the patient received the information for the influenza vaccine? YES    2.  Does the patient have any of the following contraindications?     Allergy to eggs? No     Allergic reaction to previous influenza vaccines? No     Any other problems to previous influenza vaccines? No     Paralyzed by Guillain-[place] syndrome? No     Currently pregnant? NO     Current moderate or severe illness? No    Vaccination given by MARINE RAYMUNDO CMA.  Recorded by Marine Raymundo

## 2017-10-03 NOTE — PROGRESS NOTES
"      HPI:       Laya Sainz is a 47 year old who presents for the following  Patient presents with:  Radiology Visit: left femur xray    Laya presents to follow up on an incidental finding of a lesion seen in the proximal left femur on pelvic MRI on 9/8/17.    The MRI was done to evaluate pelvic pain which she has had for years. Today she reports this pain is still present. It is intermittent and appears to be worse with lifting, bowel movements, and shifting position. The pain is actually better now than it was 6 months ago.     Regarding the lesion on her femur, Laya has no other symptoms. No femur pain, no fevers/chills or weight loss. Does not recall any history of trauma or injury to the area. No first degree relatives with cancer.    Problem, Medication and Allergy Lists were reviewed and are current.  Patient is an established patient of this clinic.         Review of Systems:   Review of Systems   Constitutional: Negative for chills, fever and unexpected weight change.   Genitourinary: Positive for pelvic pain.             Physical Exam:     Patient Vitals for the past 24 hrs:   BP Temp Temp src Pulse Resp SpO2 Weight   10/03/17 0812 109/72 97.6  F (36.4  C) Oral 61 16 99 % 131 lb (59.4 kg)     Body mass index is 21.47 kg/(m^2).  Vitals were reviewed and were normal     Physical Exam         Results:      Results from the last 24 hoursNo results found for this or any previous visit (from the past 24 hour(s)).  Assessment and Plan     1. Abnormal MRI  MRI on 9/8/17 showed incidental finding of  \"indeterminate 8mm structure in the proximal left femur. Plain film radiography could be considered.\" Laya returned to clinic today for plain film x-ray of the left femur. X-ray reviewed in clinic today and appeared to show an area of calcification that matched the lesion seen on MRI in size and location. Etiology is uncertain, but Laya continues to be asymptomatic. Will wait for radiologist report on " x-ray but will likely plan to follow up by repeating x-ray in 3-6 months to evaluate for any changes.    - X-ray lt femur; Future    2. Pelvic pain in female  Pain is stable or even somewhat improved at this visit. Reviewed the pelvic MRI from 9/8 with Laya and reassured her that it showed no other abnormalities to be concerned about. No signs of endometriosis. Reviewed other causes of her pain. She is fine managing as long as it is not something bad.     3. Immunization due  - ADMIN VACCINE, INITIAL  - Flu vaccine, quad, preserve-free, 0.5 ml        There are no discontinued medications.  Options for treatment and follow-up care were reviewed with the patient. Laya A Hand  engaged in the decision making process and verbalized understanding of the options discussed and agreed with the final plan.    This note is scribed by Cassandra Hitchcock MS3, on behalf of Dr. Lemus.    The medical student acted as scribe and the encounter documented above was completely performed by myself and the documentation reflects the work I have performed today. MD Barb Nye MD

## 2017-10-03 NOTE — PATIENT INSTRUCTIONS
Here is the plan from today's visit    1. Abnormal MRI - we will get back to you with the results.  If at all concerning. Probably repeat the xray in 3 - 6 months unless they are pretty clear that it is fine.   - X-ray lt femur; Future    2. Pelvic pain in female  Keep doing what you are doing!!          Please call or return to clinic if your symptoms don't go away.    Follow up plan      Thank you for coming to [facility] today.  Lab Testing:  **If you had lab testing today and your results are reassuring or normal they will be mailed to you or sent through [facility] within 7 days.   **If the lab tests need quick action we will call you with the results.  The phone number we will call with results is # [phone] (home) . If this is not the best number please call our clinic and change the number.  Medication Refills:  If you need any refills please call your pharmacy and they will contact us.   If you need to  your refill at a new pharmacy, please contact the new pharmacy directly. The new pharmacy will help you get your medications transferred faster.   Scheduling:  If you have any concerns about today's visit or wish to schedule another appointment please call our office during normal business hours [phone] (8-5:00 M-F)  If a referral was made to a [facility] and you don't get a call from central scheduling please call [phone].  If a Mammogram was ordered for you at [facility] call [phone] to schedule or change your appointment.  If you had an XRay/CT/Ultrasound/MRI ordered the number is [phone] to schedule or change your radiology appointment.   Medical Concerns:  If you have urgent medical concerns please call [phone] at any time of the day.  If you have a medical emergency please call [phone].

## 2017-10-03 NOTE — MR AVS SNAPSHOT
After Visit Summary   10/3/2017    Laya Sainz    MRN: [id number]           Patient Information     Date Of Birth          1970        Visit Information        Provider Department      10/3/2017 8:00 AM Barb Lemus MD [facility]        Today's Diagnoses     Abnormal MRI    -  1    Pelvic pain in female          Care Instructions    Here is the plan from today's visit    1. Abnormal MRI - we will get back to you with the results.  If at all concerning. Probably repeat the xray in 3 - 6 months unless they are pretty clear that it is fine.   - X-ray lt femur; Future    2. Pelvic pain in female  Keep doing what you are doing!!          Please call or return to clinic if your symptoms don't go away.    Follow up plan      Thank you for coming to [facility] today.  Lab Testing:  **If you had lab testing today and your results are reassuring or normal they will be mailed to you or sent through [facility] within 7 days.   **If the lab tests need quick action we will call you with the results.  The phone number we will call with results is # [phone] (home) . If this is not the best number please call our clinic and change the number.  Medication Refills:  If you need any refills please call your pharmacy and they will contact us.   If you need to  your refill at a new pharmacy, please contact the new pharmacy directly. The new pharmacy will help you get your medications transferred faster.   Scheduling:  If you have any concerns about today's visit or wish to schedule another appointment please call our office during normal business hours [phone] (8-5:00 M-F)  If a referral was made to a [facility] and you don't get a call from central scheduling please call [phone].  If a Mammogram was ordered for you at [facility] call [phone] to schedule or change your appointment.  If you had an XRay/CT/Ultrasound/MRI ordered the  number is [phone] to schedule or change your radiology appointment.   Medical Concerns:  If you have urgent medical concerns please call [phone] at any time of the day.  If you have a medical emergency please call 911.          Follow-ups after your visit        Who to contact     Please call your clinic at [phone] to:    Ask questions about your health    Make or cancel appointments    Discuss your medicines    Learn about your test results    Speak to your doctor   If you have compliments or concerns about an experience at your clinic, or if you wish to file a complaint, please contact [facility] Patient Relations at [phone] or email us at [email or web address]         Additional Information About Your Visit        Preview [email or web address] Information     [email or web address] gives you secure access to your electronic health record. If you see a primary care provider, you can also send messages to your care team and make appointments. If you have questions, please call your primary care clinic.  If you do not have a primary care provider, please call [phone] and they will assist you.      [email or web address] is an electronic gateway that provides easy, online access to your medical records. With [email or web address], you can request a clinic appointment, read your test results, renew a prescription or communicate with your care team.     To access your existing account, please contact your [facility] or call [phone] for assistance.        Care EveryWhere ID     This is your Care EveryWhere ID. This could be used by other organizations to access your [place] medical records  [id number]        Your Vitals Were     Pulse Temperature Respirations Pulse Oximetry BMI (Body Mass Index)       61 97.6  F (36.4  C) (Oral) 16 99% 21.47 kg/m2        Blood Pressure from Last 3 Encounters:   10/03/17 109/72   09/08/17 111/73   08/15/17 119/74    Weight from Last 3 Encounters:    10/03/17 131 lb (59.4 kg)   08/15/17 132 lb (59.9 kg)   07/07/17 132 lb (59.9 kg)               Primary Care Provider Office Phone # Fax #    Barb Lemus [phone]       [address] [address]        Equal Access to Services     [facility]: Hadii aad ku hadasho Soomaali, waaxda luqadaha, qaybta kaalmada adeegyada, waxay stewartin haymjn adeayaka fraserpeter marrufo . So [facility] [phone].    ATENCIÓN: Si habla esprichie, tiene a he disposición servicios gratuitos de asistencia lingüística. Darya al [phone].    We comply with applicable federal civil rights laws and [place] laws. We do not discriminate on the basis of race, color, national origin, age, disability, sex, sexual orientation, or gender identity.            Thank you!     Thank you for choosing [facility]  for your care. Our goal is always to provide you with excellent care. Hearing back from our patients is one way we can continue to improve our services. Please take a few minutes to complete the written survey that you may receive in the mail after your visit with us. Thank you!             Your Updated Medication List - Protect others around you: Learn how to safely use, store and throw away your medicines at www.disposemymeds.org.          This list is accurate as of: 10/3/17  8:44 AM.  Always use your most recent med list.                   Brand Name Dispense Instructions for use Diagnosis    loratadine 10 MG tablet    CLARITIN     Take 10 mg by mouth daily        mometasone 50 MCG/ACT spray    NASONEX     Spray 2 sprays into both nostrils daily        nitroFURantoin (macrocrystal-monohydrate) 100 MG capsule    MACROBID    30 capsule    Take 1 capsule (100 mg) by mouth daily as needed    Recurrent UTI       triamcinolone 0.1 % ointment    KENALOG    30 g    Apply sparingly to affected area three times daily for 14 days.    Contact dermatitis and other eczema, due to unspecified  cause

## 2018-03-09 ENCOUNTER — RADIANT APPOINTMENT (OUTPATIENT)
Dept: GENERAL RADIOLOGY | Facility: CLINIC | Age: 48
End: 2018-03-09
Attending: FAMILY MEDICINE
Payer: COMMERCIAL

## 2018-03-09 ENCOUNTER — OFFICE VISIT (OUTPATIENT)
Dept: FAMILY MEDICINE | Facility: CLINIC | Age: 48
End: 2018-03-09
Payer: COMMERCIAL

## 2018-03-09 VITALS
TEMPERATURE: 97.9 F | HEART RATE: 62 BPM | DIASTOLIC BLOOD PRESSURE: 80 MMHG | SYSTOLIC BLOOD PRESSURE: 124 MMHG | WEIGHT: 131.5 LBS | BODY MASS INDEX: 21.55 KG/M2 | OXYGEN SATURATION: 98 % | RESPIRATION RATE: 16 BRPM

## 2018-03-09 DIAGNOSIS — R52 PAIN: Primary | ICD-10-CM

## 2018-03-09 DIAGNOSIS — L01.00 IMPETIGO: ICD-10-CM

## 2018-03-09 DIAGNOSIS — R52 PAIN: ICD-10-CM

## 2018-03-09 RX ORDER — MUPIROCIN 20 MG/G
OINTMENT TOPICAL 3 TIMES DAILY
Qty: 22 G | Refills: 1 | Status: SHIPPED | OUTPATIENT
Start: 2018-03-09 | End: 2018-03-14

## 2018-03-09 NOTE — PROGRESS NOTES
MITALI       Laya is a 47 year old  female  who presents:    Chief Complaint   Patient presents with     Radiology Visit     xray of the left femur     Here because she had MRI of her pelvis for persistent pelvic pain.  At that time noted to have a lesion in her left femur.  Near the hip.  X-ray done did not reveal what it was, and so are following it.  She is now 6 months since first x-ray.  Continues fully asymptomatic.  No fevers or chills or weight loss.  Her pelvic pain is actually somewhat better.  She is working with a , able to localize the pain    In the fascia, and is doing stretches and strengthening of that area and is finding that somewhat helpful.    Maybe see a dermatologist - has a break out around her mouth, then heals and then recurs.  Did get at times crusty and oozy.  Like impetigo.  She has not put anything on it.  She has been picking at it.    Wanting maybe a mole check in the future.       Patient Active Problem List   Diagnosis     Endometriosis     Recurrent UTI     Abnormal Pap smear of cervix     Pelvic pain in female     Diverticulosis of large intestine without hemorrhage     Interstitial cystitis     Irritable bowel syndrome with both constipation and diarrhea       Current Outpatient Prescriptions   Medication Sig Dispense Refill     mometasone (NASONEX) 50 MCG/ACT nasal spray Spray 2 sprays into both nostrils daily       loratadine (CLARITIN) 10 MG tablet Take 10 mg by mouth daily       nitrofurantoin, macrocrystal-monohydrate, (MACROBID) 100 MG capsule Take 1 capsule (100 mg) by mouth daily as needed (Patient not taking: Reported on 7/7/2017) 30 capsule 2     triamcinolone (KENALOG) 0.1 % ointment Apply sparingly to affected area three times daily for 14 days. (Patient not taking: Reported on 7/7/2017) 30 g 0        No Known Allergies    No results found for this or any previous visit (from the past 168 hour(s)).       Review of Systems:               Physical  Exam:     Vitals:    03/09/18 0848   BP: 124/80   Pulse: 62   Resp: 16   Temp: 97.9  F (36.6  C)   TempSrc: Oral   SpO2: 98%   Weight: 131 lb 8 oz (59.6 kg)     Body mass index is 21.55 kg/(m^2).  Vitals were reviewed and were normal  SKIN: Has a small somewhat crusty, 6 mm lesion on the left lower face.  Right periorbital area with a smaller lesion.    Radiant Appointment on 10/03/2017   Component Date Value Ref Range Status     Radiologist flags 10/03/2017 Follow-up radiographs of the left hip in 3 months   Final         Assessment and Plan     Laya was seen today for radiology visit.    Diagnoses and all orders for this visit:    Likely enchondroma.  Per radiology read. -Does not appear to have grown in size over the last 6 months.  We will send her a letter with this result.  At this point she is asymptomatic so is reasonable to delay repeat imaging in the near future.  If at all symptomatic would repeat since can grow  -     X-ray lt femur; Future    Impetigo -likely impetigo.  We will treat with topical Bactroban.  If not improved in the next week to 2 weeks she is to try Cleocin T.  At some point she wants to see dermatology for a mole check and will contact me through my chart and we will ask for that at that point.  -     mupirocin (BACTROBAN) 2 % ointment; Apply topically 3 times daily for 5 days Generic ok        There are no discontinued medications.    Options for treatment and follow-up care were reviewed with the patient . Laya FELIPE Hand  engaged in the decision making process and verbalized understanding of the options discussed and agreed with the final plan.    Barb Lemus MD

## 2018-03-09 NOTE — MR AVS SNAPSHOT
After Visit Summary   3/9/2018    Laya Sainz    MRN: [id number]           Patient Information     Date Of Birth          1970        Visit Information        Provider Department      3/9/2018 8:20 AM Barb Lemus MD [facility]        Today's Diagnoses     Pain    -  1    Impetigo          Care Instructions    Here is the plan from today's visit    1. Pain  - X-ray lt femur; Future    2. Impetigo  - mupirocin (BACTROBAN) 2 % ointment; Apply topically 3 times daily for 5 days Generic ok  Dispense: 22 g; Refill: 1          Please call or return to clinic if your symptoms don't go away.    Follow up plan      Thank you for coming to [facility] today.  Lab Testing:  **If you had lab testing today and your results are reassuring or normal they will be mailed to you or sent through [facility] within 7 days.   **If the lab tests need quick action we will call you with the results.  The phone number we will call with results is # [phone] (home) . If this is not the best number please call our clinic and change the number.  Medication Refills:  If you need any refills please call your pharmacy and they will contact us.   If you need to  your refill at a new pharmacy, please contact the new pharmacy directly. The new pharmacy will help you get your medications transferred faster.   Scheduling:  If you have any concerns about today's visit or wish to schedule another appointment please call our office during normal business hours [phone] (8-5:00 M-F)  If a referral was made to a [facility] and you don't get a call from central scheduling please call [phone].  If a Mammogram was ordered for you at [facility] call [phone] to schedule or change your appointment.  If you had an XRay/CT/Ultrasound/MRI ordered the number is [phone] to schedule or change your radiology appointment.   Medical Concerns:  If you have urgent  medical concerns please call [phone] at any time of the day.  If you have a medical emergency please call 911.          Follow-ups after your visit        Who to contact     Please call your clinic at [phone] to:    Ask questions about your health    Make or cancel appointments    Discuss your medicines    Learn about your test results    Speak to your doctor            Additional Information About Your Visit        [employer] Information     [employer] gives you secure access to your electronic health record. If you see a primary care provider, you can also send messages to your care team and make appointments. If you have questions, please call your primary care clinic.  If you do not have a primary care provider, please call [phone] and they will assist you.      [employer] is an electronic gateway that provides easy, online access to your medical records. With [employer], you can request a clinic appointment, read your test results, renew a prescription or communicate with your care team.     To access your existing account, please contact your [facility] or call [phone] for assistance.        Care EveryWhere ID     This is your Care EveryWhere ID. This could be used by other organizations to access your [place] medical records  [id number]        Your Vitals Were     Pulse Temperature Respirations Pulse Oximetry BMI (Body Mass Index)       62 97.9  F (36.6  C) (Oral) 16 98% 21.55 kg/m2        Blood Pressure from Last 3 Encounters:   03/09/18 124/80   10/03/17 109/72   09/08/17 111/73    Weight from Last 3 Encounters:   03/09/18 131 lb 8 oz (59.6 kg)   10/03/17 131 lb (59.4 kg)   08/15/17 132 lb (59.9 kg)                 Today's Medication Changes          These changes are accurate as of 3/9/18  9:10 AM.  If you have any questions, ask your nurse or doctor.               Start taking these medicines.        Dose/Directions    mupirocin 2 % ointment   Commonly known as:   BACTROBAN   Used for:  Impetigo   Started by:  Barb Lemus MD        Apply topically 3 times daily for 5 days Generic ok   Quantity:  22 g   Refills:  1            Where to get your medicines      These medications were sent to [facility] [address] [facility] [address] [facility] & [address] [place] [address]     Phone:  [phone]     mupirocin 2 % ointment                Primary Care Provider Office Phone # Fax #    Barb Lemus [phone]       [address] [address]        Equal Access to Services     INDRA GOME[facility] : Hadii josep duo Soleticia, waaxda luqadaha, qaybta kaalmada adeegyada, oksana marrufo . So [facility] [phone].    ATENCIÓN: Si habla español, tiene a he disposición servicios gratuitos de asistencia lingüística. Llame al [phone].    We comply with applicable federal civil rights laws and [place] laws. We do not discriminate on the basis of race, color, national origin, age, disability, sex, sexual orientation, or gender identity.            Thank you!     Thank you for choosing \[facility]\"" [facility]  for your care. Our goal is always to provide you with excellent care. Hearing back from our patients is one way we can continue to improve our services. Please take a few minutes to complete the written survey that you may receive in the mail after your visit with us. Thank you!             Your Updated Medication List - Protect others around you: Learn how to safely use, store and throw away your medicines at www.disposemymeds.org.          This list is accurate as of 3/9/18  9:10 AM.  Always use your most recent med list.                   Brand Name Dispense Instructions for use Diagnosis    loratadine 10 MG tablet    CLARITIN     Take 10 mg by mouth daily        mometasone 50 MCG/ACT spray    NASONEX     Spray 2 sprays into both nostrils daily         mupirocin 2 % ointment    BACTROBAN    22 g    Apply topically 3 times daily for 5 days Generic ok    Impetigo       nitroFURantoin (macrocrystal-monohydrate) 100 MG capsule    MACROBID    30 capsule    Take 1 capsule (100 mg) by mouth daily as needed    Recurrent UTI       triamcinolone 0.1 % ointment    KENALOG    30 g    Apply sparingly to affected area three times daily for 14 days.    Contact dermatitis and other eczema, due to unspecified cause

## 2018-03-09 NOTE — PATIENT INSTRUCTIONS
Here is the plan from today's visit    1. Pain  - X-ray lt femur; Future    2. Impetigo  - mupirocin (BACTROBAN) 2 % ointment; Apply topically 3 times daily for 5 days Generic ok  Dispense: 22 g; Refill: 1          Please call or return to clinic if your symptoms don't go away.    Follow up plan      Thank you for coming to [facility] today.  Lab Testing:  **If you had lab testing today and your results are reassuring or normal they will be mailed to you or sent through [facility] within 7 days.   **If the lab tests need quick action we will call you with the results.  The phone number we will call with results is # [phone] (home) . If this is not the best number please call our clinic and change the number.  Medication Refills:  If you need any refills please call your pharmacy and they will contact us.   If you need to  your refill at a new pharmacy, please contact the new pharmacy directly. The new pharmacy will help you get your medications transferred faster.   Scheduling:  If you have any concerns about today's visit or wish to schedule another appointment please call our office during normal business hours [phone] (8-5:00 M-F)  If a referral was made to a [facility] and you don't get a call from central scheduling please call [phone].  If a Mammogram was ordered for you at [facility] call [phone] to schedule or change your appointment.  If you had an XRay/CT/Ultrasound/MRI ordered the number is [phone] to schedule or change your radiology appointment.   Medical Concerns:  If you have urgent medical concerns please call [phone] at any time of the day.  If you have a medical emergency please call [phone].

## 2018-07-27 ENCOUNTER — RADIANT APPOINTMENT (OUTPATIENT)
Dept: GENERAL RADIOLOGY | Facility: CLINIC | Age: 48
End: 2018-07-27
Attending: FAMILY MEDICINE
Payer: COMMERCIAL

## 2018-07-27 ENCOUNTER — OFFICE VISIT (OUTPATIENT)
Dept: FAMILY MEDICINE | Facility: CLINIC | Age: 48
End: 2018-07-27
Payer: COMMERCIAL

## 2018-07-27 VITALS
HEART RATE: 50 BPM | DIASTOLIC BLOOD PRESSURE: 83 MMHG | TEMPERATURE: 97.6 F | BODY MASS INDEX: 21.96 KG/M2 | WEIGHT: 134 LBS | SYSTOLIC BLOOD PRESSURE: 133 MMHG | OXYGEN SATURATION: 99 %

## 2018-07-27 DIAGNOSIS — R10.2 PELVIC PAIN IN FEMALE: Primary | ICD-10-CM

## 2018-07-27 DIAGNOSIS — M16.12 ARTHRITIS OF LEFT HIP: ICD-10-CM

## 2018-07-27 DIAGNOSIS — R10.2 PELVIC PAIN IN FEMALE: ICD-10-CM

## 2018-07-27 NOTE — MR AVS SNAPSHOT
After Visit Summary   7/27/2018    Laya Sainz    MRN: [id number]           Patient Information     Date Of Birth          1970        Visit Information        Provider Department      7/27/2018 8:00 AM Barb Lemus MD [facility]        Today's Diagnoses     Pelvic pain in female    -  1    Arthritis of left hip          Care Instructions    Here is the plan from today's visit    1. Pelvic pain in female  Xray looks pretty similar to me - I will get back to you if they feel otherwise.   - XR Hip Left 2-3 Views; Future    2. Arthritis of left hip  Likely have some early arthritis of the hip. Keeping moving is key. If the hip feels worse, get back to me and we can refer you to physical therapy.            Please call or return to clinic if your symptoms don't go away.    Follow up plan  As needed.     Thank you for coming to [facility] today.  Lab Testing:  **If you had lab testing today and your results are reassuring or normal they will be mailed to you or sent through Lucibel within 7 days.   **If the lab tests need quick action we will call you with the results.  The phone number we will call with results is # [phone] (home) . If this is not the best number please call our clinic and change the number.  Medication Refills:  If you need any refills please call your pharmacy and they will contact us.   If you need to  your refill at a new pharmacy, please contact the new pharmacy directly. The new pharmacy will help you get your medications transferred faster.   Scheduling:  If you have any concerns about today's visit or wish to schedule another appointment please call our office during normal business hours [phone] (8-5:00 M-F)  If a referral was made to a [facility] and you don't get a call from central scheduling please call [phone].  If a Mammogram was ordered for you at [facility] call [phone] to  schedule or change your appointment.  If you had an XRay/CT/Ultrasound/MRI ordered the number is [phone] to schedule or change your radiology appointment.   Medical Concerns:  If you have urgent medical concerns please call [phone] at any time of the day.  If you have a medical emergency please call 911.          Follow-ups after your visit        Who to contact     Please call your clinic at [phone] to:    Ask questions about your health    Make or cancel appointments    Discuss your medicines    Learn about your test results    Speak to your doctor            Additional Information About Your Visit        [employer] Information     HiringBoss gives you secure access to your electronic health record. If you see a primary care provider, you can also send messages to your care team and make appointments. If you have questions, please call your primary care clinic.  If you do not have a primary care provider, please call [phone] and they will assist you.      HiringBoss is an electronic gateway that provides easy, online access to your medical records. With HiringBoss, you can request a clinic appointment, read your test results, renew a prescription or communicate with your care team.     To access your existing account, please contact your [facility] or call [phone] for assistance.        Care EveryWhere ID     This is your Care EveryWhere ID. This could be used by other organizations to access your [place] medical records  [id number]        Your Vitals Were     Pulse Temperature Pulse Oximetry Breastfeeding? BMI (Body Mass Index)       50 97.6  F (36.4  C) (Oral) 99% No 21.96 kg/m2        Blood Pressure from Last 3 Encounters:   07/27/18 133/83   03/09/18 124/80   10/03/17 109/72    Weight from Last 3 Encounters:   07/27/18 134 lb (60.8 kg)   03/09/18 131 lb 8 oz (59.6 kg)   10/03/17 131 lb (59.4 kg)               Primary Care Provider Office Phone # Fax #    Barb  MD Allan [phone]       [address] [address]        Equal Access to Services     INDRA GOMEZ : Hadii aad ku hadmickeymounika Wu, laurenpoppy martineztiffanyha, burak blaisehudsonpoppy antonina, oksana das lajanistessie sorenson. So [facility] [phone].    ATENCIÓN: Si habla español, tiene a he disposición servicios gratuitos de asistencia lingüística. Llame al [phone].    We comply with applicable federal civil rights laws and [place] laws. We do not discriminate on the basis of race, color, national origin, age, disability, sex, sexual orientation, or gender identity.            Thank you!     Thank you for choosing [facility]  for your care. Our goal is always to provide you with excellent care. Hearing back from our patients is one way we can continue to improve our services. Please take a few minutes to complete the written survey that you may receive in the mail after your visit with us. Thank you!             Your Updated Medication List - Protect others around you: Learn how to safely use, store and throw away your medicines at www.disposemymeds.org.          This list is accurate as of 7/27/18  9:08 AM.  Always use your most recent med list.                   Brand Name Dispense Instructions for use Diagnosis    loratadine 10 MG tablet    CLARITIN     Take 10 mg by mouth daily        mometasone 50 MCG/ACT spray    NASONEX     Spray 2 sprays into both nostrils daily        nitroFURantoin (macrocrystal-monohydrate) 100 MG capsule    MACROBID    30 capsule    Take 1 capsule (100 mg) by mouth daily as needed    Recurrent UTI       triamcinolone 0.1 % ointment    KENALOG    30 g    Apply sparingly to affected area three times daily for 14 days.    Contact dermatitis and other eczema, due to unspecified cause

## 2018-07-27 NOTE — PROGRESS NOTES
HPI       Laya Sainz is a 48 year old  who presents for   Chief Complaint   Patient presents with     Fracture     Pt complains of L hip pain, especially on uneven ground. Pt missed a step at home x2 mths ago and jammed hip.         Concern: Pain in L hip    Description of the problem :Pt missed a step at home and jammed her L hip. Hip is now is pain.       End of May missed a step and then a week later did that again and hurt her left hip.  Now it feels a bit better, but hurts when on uneven ground. At times can feel it in the am.  At times it is worse when she has worked in the garden.  Wanting to make sure that her enchondroma was not growing.   Notes the pain anterior, in the inguinal area.  In the front.    Notes swelling in her hands at times and difficulty opening jars.  No other joint pains.   Has found that her chronic pelvic pain is better when she decreases gluten and is working on that.      Very active in the garden - likes to carry and move things.       Daughter - with Sjogren's and Lupus and RA.  Diagnosed at age 11 and took many years to figure out what was wrong with her.  Lupus diagnosis was just added.   +++++++      Problem, Medication and Allergy Lists were reviewed and updated if needed..    Patient is an established patient of this clinic..         Review of Systems:   Review of Systems         Physical Exam:     Vitals:    07/27/18 0836   BP: 133/83   Pulse: 50   Temp: 97.6  F (36.4  C)   TempSrc: Oral   SpO2: 99%   Weight: 134 lb (60.8 kg)     Body mass index is 21.96 kg/(m^2).  Vitals were reviewed and were normal     Physical Exam   Musculoskeletal:        Left hip: She exhibits tenderness. She exhibits normal range of motion, normal strength, no bony tenderness and no swelling.        Legs:  No pain on internal or external rotation of either hip. No pain with pelvic pressure.          Results:   Results are ordered and pending    Assessment and Plan        Laya was seen  today for fracture.    Diagnoses and all orders for this visit:    Pelvic pain in female - to me her enchondroma looks about the same size.  Not worried that her pain is from that area.  -     Cancel: X-ray lt femur; Future  -     XR Hip Left 2-3 Views; Future    Arthritis of left hip - Likely has early OA in the left hip as evidenced by her xray and is consistent with her pain. Is very functional but aware that if her symptoms get worse, to call and would refer to PT and could get injection in the future.      Total time: 25 minutes with more than half spent counseling on hip, strengthening her core, options like PT, and reassurance.          There are no discontinued medications.    Options for treatment and follow-up care were reviewed with the patient. Laya Sainz  engaged in the decision making process and verbalized understanding of the options discussed and agreed with the final plan.    Barb Lemus MD

## 2018-07-27 NOTE — PATIENT INSTRUCTIONS
Here is the plan from today's visit    1. Pelvic pain in female  Xray looks pretty similar to me - I will get back to you if they feel otherwise.   - XR Hip Left 2-3 Views; Future    2. Arthritis of left hip  Likely have some early arthritis of the hip. Keeping moving is key. If the hip feels worse, get back to me and we can refer you to physical therapy.            Please call or return to clinic if your symptoms don't go away.    Follow up plan  As needed.     Thank you for coming to [facility] today.  Lab Testing:  **If you had lab testing today and your results are reassuring or normal they will be mailed to you or sent through [employer]o within 7 days.   **If the lab tests need quick action we will call you with the results.  The phone number we will call with results is # [phone] (home) . If this is not the best number please call our clinic and change the number.  Medication Refills:  If you need any refills please call your pharmacy and they will contact us.   If you need to  your refill at a new pharmacy, please contact the new pharmacy directly. The new pharmacy will help you get your medications transferred faster.   Scheduling:  If you have any concerns about today's visit or wish to schedule another appointment please call our office during normal business hours [phone] (8-5:00 M-F)  If a referral was made to a [facility] and you don't get a call from central scheduling please call [phone].  If a Mammogram was ordered for you at [facility] call [phone] to schedule or change your appointment.  If you had an XRay/CT/Ultrasound/MRI ordered the number is [phone] to schedule or change your radiology appointment.   Medical Concerns:  If you have urgent medical concerns please call [phone] at any time of the day.  If you have a medical emergency please call [phone].

## 2018-08-13 ENCOUNTER — OFFICE VISIT (OUTPATIENT)
Dept: FAMILY MEDICINE | Facility: CLINIC | Age: 48
End: 2018-08-13
Payer: COMMERCIAL

## 2018-08-13 ENCOUNTER — TELEPHONE (OUTPATIENT)
Dept: FAMILY MEDICINE | Facility: CLINIC | Age: 48
End: 2018-08-13

## 2018-08-13 VITALS
RESPIRATION RATE: 18 BRPM | WEIGHT: 132.4 LBS | OXYGEN SATURATION: 100 % | HEART RATE: 58 BPM | TEMPERATURE: 97.6 F | BODY MASS INDEX: 21.7 KG/M2 | SYSTOLIC BLOOD PRESSURE: 128 MMHG | DIASTOLIC BLOOD PRESSURE: 80 MMHG

## 2018-08-13 DIAGNOSIS — M26.622 ARTHRALGIA OF LEFT TEMPOROMANDIBULAR JOINT: Primary | ICD-10-CM

## 2018-08-13 RX ORDER — IBUPROFEN 600 MG/1
600 TABLET, FILM COATED ORAL EVERY 8 HOURS
Qty: 90 TABLET | Refills: 0 | Status: SHIPPED | OUTPATIENT
Start: 2018-08-13 | End: 2019-11-19

## 2018-08-13 RX ORDER — AMOXICILLIN AND CLAVULANATE POTASSIUM 500; 125 MG/1; MG/1
1 TABLET, FILM COATED ORAL 3 TIMES DAILY
COMMUNITY
End: 2019-11-05

## 2018-08-13 RX ORDER — CYCLOBENZAPRINE HCL 5 MG
5 TABLET ORAL 3 TIMES DAILY PRN
Qty: 42 TABLET | Refills: 0 | Status: SHIPPED | OUTPATIENT
Start: 2018-08-13 | End: 2019-11-05

## 2018-08-13 ASSESSMENT — ENCOUNTER SYMPTOMS
WHEEZING: 0
CONSTIPATION: 0
FEVER: 0
DIARRHEA: 0
EYE DISCHARGE: 1
HEADACHES: 1
SINUS PRESSURE: 1
NAUSEA: 1
COUGH: 0
RHINORRHEA: 0
SORE THROAT: 0
ARTHRALGIAS: 1
VOMITING: 0
SINUS PAIN: 1
CHILLS: 0
MYALGIAS: 0

## 2018-08-13 NOTE — MR AVS SNAPSHOT
After Visit Summary   8/13/2018    Laya Sainz    MRN: [id number]           Patient Information     Date Of Birth          1970        Visit Information        Provider Department      8/13/2018 1:40 PM Kirby Khan MD [facility]        Today's Diagnoses     Arthralgia of left temporomandibular joint    -  1       Follow-ups after your visit        Follow-up notes from your care team     Return in about 2 weeks (around 8/27/2018) for Physical Exam.      Who to contact     Please call your clinic at [phone] to:    Ask questions about your health    Make or cancel appointments    Discuss your medicines    Learn about your test results    Speak to your doctor            Additional Information About Your Visit        [employer] Information     Klout gives you secure access to your electronic health record. If you see a primary care provider, you can also send messages to your care team and make appointments. If you have questions, please call your primary care clinic.  If you do not have a primary care provider, please call [phone] and they will assist you.      Klout is an electronic gateway that provides easy, online access to your medical records. With Klout, you can request a clinic appointment, read your test results, renew a prescription or communicate with your care team.     To access your existing account, please contact your [facility] or call [phone] for assistance.        Care EveryWhere ID     This is your Care EveryWhere ID. This could be used by other organizations to access your [place] medical records  [id number]        Your Vitals Were     Pulse Temperature Respirations Pulse Oximetry BMI (Body Mass Index)       58 97.6  F (36.4  C) (Oral) 18 100% 21.7 kg/m2        Blood Pressure from Last 3 Encounters:   08/13/18 128/80   07/27/18 133/83   03/09/18 124/80    Weight from Last 3 Encounters:   08/13/18 132  lb 6.4 oz (60.1 kg)   07/27/18 134 lb (60.8 kg)   03/09/18 131 lb 8 oz (59.6 kg)              Today, you had the following     No orders found for display         Today's Medication Changes          These changes are accurate as of 8/13/18 11:59 PM.  If you have any questions, ask your nurse or doctor.               Start taking these medicines.        Dose/Directions    cyclobenzaprine 5 MG tablet   Commonly known as:  FLEXERIL   Used for:  Arthralgia of left temporomandibular joint   Started by:  Kirby Khan MD        Dose:  5 mg   Take 1 tablet (5 mg) by mouth 3 times daily as needed for muscle spasms   Quantity:  42 tablet   Refills:  0       ibuprofen 600 MG tablet   Commonly known as:  ADVIL/MOTRIN   Used for:  Arthralgia of left temporomandibular joint   Started by:  Kirby Khan MD        Dose:  600 mg   Take 1 tablet (600 mg) by mouth every 8 hours   Quantity:  90 tablet   Refills:  0            Where to get your medicines      These medications were sent to [facility] [address] [place] [address] AT [facility] & [address] [place] [id number]    Hours:  24-hours Phone:  [phone]     cyclobenzaprine 5 MG tablet    ibuprofen 600 MG tablet                Primary Care Provider Office Phone # Fax #    Barb Lemus [phone]       [address] [place] [address]        Equal Access to Services     INDRA GOMEZ AH: Hadii josep ku hadasho Soomaali, waaxda luqadaha, qaybta kaalmada adeegyada, waxay bryant marrufo . So [facility] [phone].    ATENCIÓN: Si habla chalo, tiene a he disposición servicios gratuitos de asistencia lingüística. Llame al [phone].    We comply with applicable federal civil rights laws and [place] laws. We do not discriminate on the basis of race, color, national origin, age, disability, sex, sexual orientation, or gender identity.            Thank you!      Thank you for choosing [facility]  for your care. Our goal is always to provide you with excellent care. Hearing back from our patients is one way we can continue to improve our services. Please take a few minutes to complete the written survey that you may receive in the mail after your visit with us. Thank you!             Your Updated Medication List - Protect others around you: Learn how to safely use, store and throw away your medicines at www.disposemymeds.org.          This list is accurate as of 8/13/18 11:59 PM.  Always use your most recent med list.                   Brand Name Dispense Instructions for use Diagnosis    amoxicillin-clavulanate 500-125 MG per tablet    AUGMENTIN     Take 1 tablet by mouth 3 times daily        cyclobenzaprine 5 MG tablet    FLEXERIL    42 tablet    Take 1 tablet (5 mg) by mouth 3 times daily as needed for muscle spasms    Arthralgia of left temporomandibular joint       ibuprofen 600 MG tablet    ADVIL/MOTRIN    90 tablet    Take 1 tablet (600 mg) by mouth every 8 hours    Arthralgia of left temporomandibular joint       loratadine 10 MG tablet    CLARITIN     Take 10 mg by mouth daily        mometasone 50 MCG/ACT spray    NASONEX     Spray 2 sprays into both nostrils daily

## 2018-08-13 NOTE — PROGRESS NOTES
Preceptor Attestation:   Patient seen and discussed with the resident. Assessment and plan reviewed with resident and agreed upon.   Supervising Physician:  [facility]

## 2018-08-13 NOTE — TELEPHONE ENCOUNTER
"P [facility] phone call message-patient reporting a symptom:     Symptom: Bad earache radiating through body now    Same Day Visit Offered: none available    Additional comments: Got anti biotics from a \"tele doc\".  Has tried ibuprofen, tylenol, 2 oxycodone from old rx and nothing is helping the pain.    OK to leave message on voice mail? Yes    Primary language: English      needed? No    Call taken on August 13, 2018 at 11:40 AM by Anuradha Forrester        "

## 2018-08-13 NOTE — PROGRESS NOTES
"       HPI       Laya Sainz is a 48 year old  who presents for   Chief Complaint   Patient presents with     Ear Problem     Left ear pain took 500 mg 3x daily amoxicillin  from tele doc ,  10-14 days sinus pressure        Approx. 2 weeks ago she noticed an achy pain in her left jaw w/ some associated sinus pressure. This pain got worse to the point that she was seen via telemedicine 2 days ago and was prescribed amoxicillin for presumed ear infection. She has not had any fevers, chills, congestion, runny nose, or cough. As of today, she does not feel as though her condition is improving after 3 doses of amoxicillin.    She has a history of grinding her teeth at night and was seen a few months ago by her dentist for bite realignment.     Acute Illness   Concerns: L ear pain   When did it start? ~2 weeks (pain started in jaw)  Is it getting better, worse or staying the same? unchanged    Fatigue/Achiness?:No     Fever?: No     Chills/Sweats?: No     Headache (location?) YES L frontal, cheek, parietal    Sinus Pressure?: YES been going on for ~3 months    Eye redness/Discharge?:  YES \"goopy\" discharge on L eye    Ear Pain?:  YES does not hurt to touch/move ear, but \"feels like there is a large golf ball deep inside pushing on everything\", pain is worsened with talking/eating/swallowing    Runny nose?: No     Congestion?:  YES But did a Angela pot and nothing came out    Sore Throat?: No   Respiratory    Cough?: no     Wheeze?: No   GI/    Decreased Appetite?:  YES hasn't eaten for the past couple days    Nausea?: YES \"on-and-off\"    Vomiting?: No     Diarrhea?:  No     Dysuria/Frequency?.:No       Any Illness Exposure?: No     Any foreign travel or contact with anyone ill who travelled abroad? No     Therapies Tried and outcome: Amoxicillin, ibuprofen, mucinex, sudafed, Advil sinus pain, oxycodone (old script, 1 on Sat, 2 on Sun), Nyquil, Details: Feels like none of the OTC meds or oxycodone has significantly " helped with the pain. The baseline pain seems to be a little better since starting amoxicillin       +++++++    Problem, Medication and Allergy Lists were reviewed and updated if needed..    Patient is an established patient of this clinic..         Review of Systems:   Review of Systems   Constitutional: Negative for chills and fever.   HENT: Positive for ear pain, sinus pain and sinus pressure. Negative for ear discharge, rhinorrhea and sore throat.    Eyes: Positive for discharge.   Respiratory: Negative for cough and wheezing.    Gastrointestinal: Positive for nausea. Negative for constipation, diarrhea and vomiting.   Musculoskeletal: Positive for arthralgias. Negative for myalgias.   Neurological: Positive for headaches.            Physical Exam:     Vitals:    08/13/18 1344   BP: 128/80   Pulse: 58   Resp: 18   Temp: 97.6  F (36.4  C)   TempSrc: Oral   SpO2: 100%   Weight: 132 lb 6.4 oz (60.1 kg)     Body mass index is 21.7 kg/(m^2).  Vitals were reviewed and were normal     Physical Exam   Constitutional: She is oriented to person, place, and time. She appears well-developed and well-nourished.   HENT:   Head: Normocephalic and atraumatic.   Right Ear: Tympanic membrane, external ear and ear canal normal.   Left Ear: Tympanic membrane, external ear and ear canal normal. No drainage, swelling or tenderness. Tympanic membrane is not erythematous and not bulging.   Mouth/Throat: Oropharynx is clear and moist.   Neck: Muscular tenderness present.   Cardiovascular: Normal rate, regular rhythm and normal heart sounds.    Pulmonary/Chest: Effort normal and breath sounds normal.   Musculoskeletal:   Tenderness to palpation on left TMJ. Mild clicking felt on opening/closing of mouth.   Neurological: She is alert and oriented to person, place, and time.   Normal appearance of dentition. No TTP over mandible or maxillary jaw.       Results:   No testing ordered today    Assessment and Plan        1. Arthralgia of left  temporomandibular joint  Her history is not very convincing for an infectious process (no fever, congestion, etc.) and exam points towards TMJ dysfunction as cause of her pain (tenderness over TMJ, clicking, hx of teeth grinding). Trigeminal neuralgia is less likely due to tempo (waxes and wanes over minutes/hours instead of seconds/mintues) and character of pain (dull ache rather than sharp & stabby) She was recommended to follow-up with her dentist at the earliest convenience to be evaluated for teeth grinding and be fitted for a mouth guard. In the mean time, she will do a two-week trial of ibuprofen and cyclobenzaprine for pain. Once she has seen the dentist and completed the medication trial of 2 weeks, she should return to clinic for follow-up.  - Discontinue amoxicillin that was prescribed by tele-medicine physician for presumed AOM.   - cyclobenzaprine (FLEXERIL) 5 MG tablet; Take 1 tablet (5 mg) by mouth 3 times daily as needed for muscle spasms  Dispense: 42 tablet; Refill: 0  - ibuprofen (ADVIL/MOTRIN) 600 MG tablet; Take 1 tablet (600 mg) by mouth every 8 hours  Dispense: 90 tablet; Refill: 0       There are no discontinued medications.    Options for treatment and follow-up care were reviewed with the patient. Laya Sainz  engaged in the decision making process and verbalized understanding of the options discussed and agreed with the final plan.    Hiral Monge, MS3    Preceptor Attestation:  I was present with the medical student who participated in the service and in the documentation of this note. I have verified the history and personally performed the physical exam and medical decision making. I have verified the content of the note, which accurately reflects my assessment of the patient and the plan of care.   Supervising Physician:  Kirby Khan MD

## 2018-08-29 ENCOUNTER — TELEPHONE (OUTPATIENT)
Dept: FAMILY MEDICINE | Facility: CLINIC | Age: 48
End: 2018-08-29

## 2018-08-29 DIAGNOSIS — R30.0 DYSURIA: Primary | ICD-10-CM

## 2018-08-29 NOTE — TELEPHONE ENCOUNTER
[facility] phone call message- general phone call:    Reason for call: Labs    Action Desired: Patient is requesting to just come in and do a UA for her frequent urinary issues (Possible UTI). Patient stated that she has these issues frequently and is not able to miss work for a full appointment.     Return call needed: Yes    OK to leave a message on voice mail? Yes    Advised patient response may take up to 2 business days: Yes    Primary language: English      needed? No    Call taken on August 29, 2018 at 10:01 AM by Sarah Kelly [facility]

## 2018-08-30 DIAGNOSIS — R30.0 DYSURIA: ICD-10-CM

## 2018-08-30 LAB
BILIRUBIN UR: NEGATIVE
BLOOD UR: NEGATIVE
GLUCOSE URINE: NEGATIVE
KETONES UR QL: NEGATIVE
LEUKOCYTE ESTERASE UR: ABNORMAL
NITRITE UR QL STRIP: NEGATIVE
PH UR STRIP: 8 [PH] (ref 5–7)
PROTEIN UR: NEGATIVE
SP GR UR STRIP: 1.02
UROBILINOGEN UR STRIP-ACNC: ABNORMAL

## 2018-09-02 ENCOUNTER — TELEPHONE (OUTPATIENT)
Dept: FAMILY MEDICINE | Facility: CLINIC | Age: 48
End: 2018-09-02

## 2018-09-02 DIAGNOSIS — R30.0 DYSURIA: Primary | ICD-10-CM

## 2018-09-02 RX ORDER — SULFAMETHOXAZOLE/TRIMETHOPRIM 800-160 MG
1 TABLET ORAL 2 TIMES DAILY
Qty: 6 TABLET | Refills: 0 | Status: SHIPPED | OUTPATIENT
Start: 2018-09-02 | End: 2018-09-05

## 2018-09-02 NOTE — TELEPHONE ENCOUNTER
Patient calling regarding mychart message regarding UA with positive leukocyte esterase indicating a possible UTI. She is symptomatic with dysuria, frequency, and generalized malaise. No abdominal pain, flank pain, or measured fevers. We discussed treatment for a symptomatic UTI with bactrim DS BID x 3 days. She will come to clinic if symptoms not resolved with treatment.     Rich Snider MD on 9/2/2018 at 8:56 AM

## 2019-02-22 ENCOUNTER — TRANSFERRED RECORDS (OUTPATIENT)
Dept: HEALTH INFORMATION MANAGEMENT | Facility: CLINIC | Age: 49
End: 2019-02-22

## 2019-06-12 ENCOUNTER — OFFICE VISIT (OUTPATIENT)
Dept: FAMILY MEDICINE | Facility: CLINIC | Age: 49
End: 2019-06-12
Payer: COMMERCIAL

## 2019-06-12 VITALS
OXYGEN SATURATION: 100 % | TEMPERATURE: 98 F | BODY MASS INDEX: 22.98 KG/M2 | RESPIRATION RATE: 16 BRPM | SYSTOLIC BLOOD PRESSURE: 114 MMHG | WEIGHT: 140.2 LBS | HEART RATE: 58 BPM | DIASTOLIC BLOOD PRESSURE: 74 MMHG

## 2019-06-12 DIAGNOSIS — N89.8 VAGINAL DISCHARGE: ICD-10-CM

## 2019-06-12 DIAGNOSIS — R39.89 POSSIBLE URINARY TRACT INFECTION: Primary | ICD-10-CM

## 2019-06-12 LAB
BACTERIA: NORMAL
BACTERIA: NORMAL
BILIRUBIN UR: NEGATIVE
BLOOD UR: ABNORMAL
CASTS: NORMAL /LPF
CLUE CELLS: NORMAL
CRYSTAL URINE: NORMAL /LPF
EPITHELIAL CELLS UR: NORMAL /LPF (ref 0–2)
GLUCOSE URINE: NEGATIVE
KETONES UR QL: NEGATIVE
LEUKOCYTE ESTERASE UR: ABNORMAL
MOTILE TRICHOMONAS: NEGATIVE
MUCOUS URINE: NORMAL LPF
NITRITE UR QL STRIP: NEGATIVE
ODOR: NORMAL
PH UR STRIP: 7 [PH] (ref 4.5–8)
PH WET PREP: <4.5 (ref 3.8–4.5)
PROTEIN UR: NEGATIVE
RBC URINE: NORMAL /HPF
SP GR UR STRIP: 1.01 (ref 1–1.03)
UROBILINOGEN UR STRIP-ACNC: ABNORMAL
WBC URINE: NORMAL /HPF
WBC WET PREP: NORMAL (ref 2–5)
YEAST: NORMAL

## 2019-06-12 RX ORDER — CLINDAMYCIN HCL 300 MG
300 CAPSULE ORAL EVERY 8 HOURS
Refills: 0 | COMMUNITY
Start: 2019-06-06 | End: 2019-11-05

## 2019-06-12 NOTE — PROGRESS NOTES
"urine       HPI       Laya Sainz is a 49 year old  who presents for   Chief Complaint   Patient presents with     Urinary Problem     Hx/o of UTI,  urinary urgency, burning and sharp pain during urination yesterday morning. Currently has been on antibiotics for 3 weeks due to dental issues     Urinary Tract Symptoms  Onset: Yesterday morning    Description:   Painful urination (Dysuria): Yes Details: \"glass going through urethra\"  Frequent urination:  Yes  Need to urinate urgently: Yes   Blood in urine (Hematuria):no  Delay in urine (Hesitency): no    Intensity: Symptoms were severe yesterday morning, but has had complete resolution of symptoms since then    Progression of Symptoms:  improving    Accompanying Signs & Symptoms:  Fever/chills: no  Flank pain no  Nausea and vomiting: no  Any vaginal discharge/symptoms: {Yes Details: Patient thinks she could be developing a yeast infection, feels she has a little bit more discharge than usual.  Abdominal/Pelvic Pain: no    History:   History of frequent UTI's: Yes   History of kidney stones: no  Sexually Active: no sexual activity prior to this onset of symptoms    Patient has been on antibiotics for nearly 3 weeks for dental abscess, currently on clindamycin.  She does not have a report from her pubic area, other than trimming at her bikini line.  She states her symptoms are similar to previous UTIs, but a little bit different, given the severity of the pain she first felt, and that now her symptoms have completely resolved.    +++++++  Problem, Medication and Allergy Lists were reviewed and updated if needed..    Patient is an established patient of this clinic..         Review of Systems:   Review of Systems         Physical Exam:     Vitals:    06/12/19 0832   BP: 114/74   Pulse: 58   Resp: 16   Temp: 98  F (36.7  C)   TempSrc: Oral   SpO2: 100%   Weight: 63.6 kg (140 lb 3.2 oz)     Body mass index is 22.98 kg/m .  Vitals were reviewed and were normal   "   Physical Exam   Constitutional: She appears well-developed and well-nourished. No distress.   Cardiovascular: Normal rate.   Pulmonary/Chest: Effort normal.   Musculoskeletal: She exhibits no edema.   Neurological: She is alert.   Skin: She is not diaphoretic.   Psychiatric: She has a normal mood and affect. Her behavior is normal. Judgment and thought content normal.   Vitals reviewed.  Patient declined  exam      Results:      Results from this visit  Results for orders placed or performed in visit on 06/12/19   Urinalysis, Micro If (UA) ([facility])   Result Value Ref Range    Specific Gravity Urine 1.010 1.005 - 1.030    pH Urine 7.0 4.5 - 8.0    Leukocyte Esterase UR 1+ (A) -ATIVE    Nitrite Urine Negative -ATIVE    Protein UR Negative -ATIVE    Glucose Urine Negative -ATIVE    Ketones Urine Negative -ATIVE    Urobilinogen mg/dL 0.2 E.U./dL 0.2 E.U./dL    Bilirubin UR Negative -ATIVE    Blood UR Trace-intact (A) -ATIVE   Urine Microscopic (UA) ([facility])   Result Value Ref Range    WBC Urine 10-25 <5 /hpf    RBC Urine 5-10 <5 /hpf    Epithelial Cells UR 10-25 0 - 2 /lpf    Mucous Urine None NONE lpf    Casts Urine None NONE /lpf    Crystal Urine None NONE /lpf    Bacteria Wet Prep Few None   Wet Prep ([facility])   Result Value Ref Range    Yeast Wet Prep None none    Motile Trichomonas Wet Prep Negative Negative    Clue Cells Wet Prep Present <20% NONE    WBC WET PREP 2-5 2 - 5    Bacteria Wet Prep Few None    pH Wet Prep <4.5 3.8 - 4.5    Odor Wet Prep None NONE       Assessment and Plan        1. Possible urinary tract infection  - Urinalysis, Micro If (UA) ([facility])  - Urine Microscopic (UA) ([facility])  - Urine Culture Aerobic Bacterial    2. Vaginal discharge  - Wet Prep ([facility])    Patient symptoms are concerning for urinary tract infection versus kidney stone versus yeast infection.  Urinalysis not very remarkable for an infection, however, patient states that urinalysis has sometimes been normal  which she does have a UTI, will obtain a urine culture, which is pending.  We will communicate results via [facility] if normal, or call patient if results are abnormal.  Patient could have yeast infection causing her symptoms, and she has significant risk factors for it given the long antibiotic use.  However, self wet prep done without any signs of yeast.  Patient could also have symptoms related to menopause, if patient returns for recurrent symptoms, would encourage provider to ask about menopausal symptoms.     There are no discontinued medications.    Options for treatment and follow-up care were reviewed with the patient. Laya Sainz  engaged in the decision making process and verbalized understanding of the options discussed and agreed with the final plan.    Kristina Peng, DO

## 2019-06-14 LAB
BACTERIA SPEC CULT: ABNORMAL
BACTERIA SPEC CULT: ABNORMAL
Lab: ABNORMAL
SPECIMEN SOURCE: ABNORMAL

## 2019-06-17 ENCOUNTER — TELEPHONE (OUTPATIENT)
Dept: FAMILY MEDICINE | Facility: CLINIC | Age: 49
End: 2019-06-17

## 2019-06-17 DIAGNOSIS — N30.00 ACUTE CYSTITIS WITHOUT HEMATURIA: Primary | ICD-10-CM

## 2019-06-17 RX ORDER — NITROFURANTOIN 25; 75 MG/1; MG/1
100 CAPSULE ORAL 2 TIMES DAILY
Qty: 10 CAPSULE | Refills: 0 | Status: SHIPPED | OUTPATIENT
Start: 2019-06-17 | End: 2019-11-05

## 2019-06-17 NOTE — TELEPHONE ENCOUNTER
Spoke with PING Cavanaugh after receiving page.    Reviewed results and called patient myself, prescribed macrobid 100mg BID x 5 days. Patient still having intermittent symptoms.    Kristina Peng DO  PGY2 Family Medicine Resident  Pager: [phone]

## 2019-06-17 NOTE — TELEPHONE ENCOUNTER
Patient calling to follow up on lab results, as when she called 55 minutes ago, she was told that someone would call her back in 20 minutes. Patient has not been contacted.    Spoke with PING Cavanaugh. Afshan advised that he spoke with Dr Peng who confirmed that she will contact patient with results before the end of the day today. Relayed this information to patient. Patient expressed understanding.

## 2019-06-17 NOTE — TELEPHONE ENCOUNTER
[facility] phone call message- general phone call: Patient requesting a call back on her test result. [facility] and would like to know whether she needs a prescription or not.    Reason for call: Call back on the bases of her test results.    Action Desired: None    Return call needed: Yes    OK to leave a message on voice mail? Yes    Advised patient response may take up to 2 business days: Yes    Primary language: English      needed? No    Call taken on June 17, 2019 at 10:44 AM by Pilar Kelly to [facility] TRIAGE

## 2019-08-22 NOTE — TELEPHONE ENCOUNTER
"          Subjective     Chief Complaint:  Physical Exam.    History of Present Illness    History obtained from the patient.    The patient is establishing care.    She reports recurrent UTI's x 2-3 month.  She has been on Cipro and Macrobid.  She has been sexually active for 2-3 months.  She is complaining of a a white/yellow vaginal discharge, no odor. She has had fatigue and weight gain, but no breast tenderness.  She has had periumbilical abdominal pain and nausea for several years.  No other GI/ symptoms, as noted in ROS. Per review of her recent health summary, GC, Chlamydia, and RPR were negative in May 2019.      Shantel Bryan is a 23 y.o. female who presents for an Annual Physical.      The patient presents for a Pap Smear.  She is G [0 ]  P [0 ].  Her menstrual cycles [are] regular.  Last menstrual period was [7/26/19 ].  She [denies] dysmenorrhea.  She [denies] menorrhagia.  She [denies] dyspareunia.  She [is] sexually active, but her current boyfriend lives in [place].  She has had 3 LTSP's.  She uses [condoms \"most of the time\"] for birth control.  She had been on ocp's, but she did not feel well on them.  She switch to the NuvaRing, which she tolerated, but stopped it 2 months ago.  Her last Pap smear was [None ].  She [denies] abnormal Pap Smears in the past.  She [denies] sexually-transmitted diseases and HPV in the past.  She [denies] FILI Exposure in utero.      She [does] do self breast exam.  She [denies) breast mass, breast pain, nipple inversion, or nipple discharge.  Last Mammogram was [N/A ].  Last DEXA Scan was [ N/A].  Last Colonoscopy was [N/A ].    She [does not take] a daily Multivitamin.  She [has not] had the Gardasil vaccination.      She [denies] a family history of Colon Cancer, Colon Polyps, Breast Cancer, Uterine Cancer, Cervical Cancer, or Ovarian Cancer.          PMH, PSH, SocHx, FamHx, Allergies, and Medications: Reviewed and updated.    No outpatient medications prior to " RN returned call to patient.    Pt states she was having ear pain for about 10 days but thought it was d/t clenching her teeth. Saturday the pain in L eye got way worse. Called tele doc who prescribed weeks worth of amoxicillin and started Sunday. Pt states it feels a little better but still gets waves of severe pain. Is maxed out on tylenol and advil and has even taken 2 oxycodone with no relief at all.     Pt denies fever, swelling, ear injury , discharge or bleeding, hearing loss or ringing in the ears. Pt does endorse a little achiness, lightheadedness and can hear some cracking and popping    RN stated pt should be seen so her ears can be examined and look in her ears to assess ear drum. Pt verbalized understanding and was scheduled for 140 today    Alina Knott RN     visit.     No facility-administered medications prior to visit.        Immunization History   Administered Date(s) Administered   • HPV Quadrivalent 08/22/2019         There is no problem list on file for this patient.      Health Habits:  Dental Exam. up to date  Eye Exam. up to date  Hearing Loss:  No  Exercise: 3-4 times/week.  Current exercise activities include: running/ jogging, yoga and Avinash  Diet: Healthy  Multivitamin: No    Safe Driving:  Yes  Seat Belt:  Yes  Bike Helmet:  Yes  Skin Screening:  Yes  Sunscreen: Yes  SBE / HOLLAND: No  Sexual Activity:  Yes  Birth Control:  Condoms  STD Prevention:  Condoms    Last Pap: None  Last Mammogram:  N/A  Last DEXA Scan: N/A  Last Colonoscopy: N/A  Last PSA: N/A    Social:    Social History     Socioeconomic History   • Marital status: Single     Spouse name: Not on file   • Number of children: 0   • Years of education: Not on file   • Highest education level: Not on file   Occupational History   • Occupation:      Comment: full time   Tobacco Use   • Smoking status: Never Smoker   • Smokeless tobacco: Never Used   Substance and Sexual Activity   • Alcohol use: Yes     Comment: 1/2 glass of wine per week   • Drug use: No   • Sexual activity: Yes     Partners: Male     Birth control/protection: Ring         Current Medical Providers:    Carlee Hernandez MD (Internal Medicine / Pediatrics)    The [facility] providers who are involved in the care of this patient are listed above.         Review of Systems   Constitutional: Positive for chills (occasional), fatigue and unexpected weight change (up 4-5 pounds in 2-3 months). Negative for fever.        No night sweats.    HENT: Negative for congestion, ear pain, hearing loss, nosebleeds, postnasal drip, rhinorrhea, sinus pressure, sinus pain, sneezing, sore throat, tinnitus and voice change.         Denies snoring.   Eyes: Negative for photophobia, pain, discharge, redness, itching and visual disturbance.  "  Respiratory: Negative for cough, chest tightness, shortness of breath, wheezing and stridor.         No chest congestion.  No hemoptysis.   Cardiovascular: Negative for chest pain, palpitations and leg swelling.        No orthopnea, EASTON, or PND.  No claudication or syncope.   Gastrointestinal: Positive for abdominal pain and nausea. Negative for blood in stool, constipation, diarrhea, rectal pain and vomiting.        No melena.  No hematemesis.  No heartburn, dysphagia or odynophagia.  No early satiety, belching, or bloating.    Endocrine: Negative for cold intolerance, heat intolerance, polydipsia, polyphagia and polyuria.        No hair loss or dry skin.  No hot flashes.     Genitourinary: Positive for vaginal discharge. Negative for difficulty urinating, dyspareunia, dysuria, flank pain, frequency, hematuria, menstrual problem, pelvic pain, urgency and vaginal bleeding.        No nocturia, incomplete emptying, or incontinence.   Musculoskeletal: Positive for back pain (occasional). Negative for arthralgias, gait problem, joint swelling, myalgias, neck pain and neck stiffness.        No joint stiffness.   Skin: Negative for rash.        No new skin lesions or changes in skin lesions. No breast pain or masses.  No nipple discharge or nipple inversion.   Neurological: Positive for headaches (occasional). Negative for dizziness, tremors, syncope, speech difficulty, weakness, light-headedness and numbness.        No tingling.  No memory loss.  No decreased concentration.   Hematological: Negative for adenopathy. Does not bruise/bleed easily.   Psychiatric/Behavioral: Negative for confusion, sleep disturbance and suicidal ideas. The patient is not nervous/anxious.         No depression.           Objective     Vitals:    08/22/19 1214   BP: 122/82   BP Location: Right arm   Pulse: 72   Resp: 20   Temp: 97.6 °F (36.4 °C)   TempSrc: Temporal   Weight: 68 kg (150 lb)   Height: 161.9 cm (63.75\")       Body mass index is " 25.95 kg/m².    Physical Exam   Constitutional: She appears well-developed and well-nourished.   HENT:   Head: Normocephalic and atraumatic.   Right Ear: Tympanic membrane, external ear and ear canal normal.   Left Ear: Tympanic membrane, external ear and ear canal normal.   Mouth/Throat: Oropharynx is clear and moist. No oral lesions.   Tonsils normal.   Eyes: Conjunctivae and EOM are normal. Pupils are equal, round, and reactive to light.   Neck: Normal range of motion. Neck supple. Carotid bruit is not present. No thyroid mass and no thyromegaly present.   Cardiovascular: Normal rate, regular rhythm and intact distal pulses. Exam reveals no gallop and no friction rub.   No murmur heard.  No peripheral edema.   Pulmonary/Chest: Effort normal and breath sounds normal. Right breast exhibits no inverted nipple, no mass, no nipple discharge, no skin change and no tenderness. Left breast exhibits no inverted nipple, no mass, no nipple discharge, no skin change and no tenderness.   Abdominal: Soft. Bowel sounds are normal. She exhibits no distension, no abdominal bruit and no mass. There is no hepatosplenomegaly. There is no tenderness.   Rectal exam deferred due to age  .   Genitourinary: Uterus normal. Pelvic exam was performed with patient prone. There is no rash or lesion on the right labia. There is no rash or lesion on the left labia. Uterus is not tender. Cervix exhibits no motion tenderness, no discharge and no friability. Right adnexum displays no mass and no tenderness. Left adnexum displays no mass and no tenderness. Vaginal discharge (scant, thin, clear) found.   Genitourinary Comments: Cervix is without lesions or erythema.  No uterine masses.   Musculoskeletal: Normal range of motion. She exhibits no edema or tenderness.   Lymphadenopathy:     She has no cervical adenopathy.     She has no axillary adenopathy. No inguinal adenopathy noted on the right or left side.        Right: No inguinal and no  supraclavicular adenopathy present.        Left: No inguinal and no supraclavicular adenopathy present.   Neurological: She is alert. She has normal strength and normal reflexes. No cranial nerve deficit. She exhibits normal muscle tone. Coordination and gait normal.   Skin: No lesion and no rash noted.   No atypical skin lesions.   Psychiatric: She has a normal mood and affect.   Nursing note and vitals reviewed.      PHQ-2 Depression Screening  Little interest or pleasure in doing things? 0   Feeling down, depressed, or hopeless? 0   PHQ-2 Total Score 0     Results for orders placed or performed in visit on 08/22/19   POC Urinalysis Dipstick, Automated   Result Value Ref Range    Color Yellow Yellow, Straw, Dark Yellow, Carol    Clarity, UA Clear Clear    Specific Gravity  1.010 1.005 - 1.030    pH, Urine 7.0 5.0 - 8.0    Leukocytes Negative Negative    Nitrite, UA Negative Negative    Protein, POC Negative Negative mg/dL    Glucose, UA Negative Negative, 1000 mg/dL (3+) mg/dL    Ketones, UA Negative Negative    Urobilinogen, UA Normal Normal    Bilirubin Negative Negative    Blood, UA Negative Negative    Lot Number [id number]     Expiration Date 2-29-20    POC Pregnancy, Urine   Result Value Ref Range    HCG, Urine, QL Negative Negative    Lot Number [id number]     Internal Positive Control Positive     Internal Negative Control Negative     Expiration Date 10-31-20          Counseling was given to patient for the following topics:  appropriate exercise, healthy eating habits, disease prevention, risk factors for cancer, importance of self breast exam and breast health, importance of immunizations, including risks and benefits, birth control counseling including side effects, sun safety, seatbelt use, safe driving and safe sex. Also discussed the importance of regular dental and vision care, as well recommendation for a yearly screening skin exam after age 40.  Written information provided to patient on these  topics and other health maintenance issues.      Assessment/Plan       Shantel was seen today for abdominal pain, vaginal discharge and nausea.    Diagnoses and all orders for this visit:    Encounter for health maintenance examination in adult  -     Comprehensive Metabolic Panel  -     TSH  -     Vitamin D 25 Hydroxy  -     CBC & Differential  -     Cholesterol, Total  -     HDL Cholesterol  -     CBC Auto Differential    Encounter for cervical Pap smear with pelvic exam  -     Liquid-based Pap Smear, Screening; Future  -     Liquid-based Pap Smear, Screening    Acute vaginitis  -     POC Urinalysis Dipstick, Automated  -     NuSwab VG+ - Swab, Vagina    Periumbilical abdominal pain  -     Celiac Comprehensive Panel    Other fatigue  -     POC Pregnancy, Urine    Encounter for surveillance of vaginal ring hormonal contraceptive device  -     etonogestrel-ethinyl estradiol (NUVARING) 0.12-0.015 MG/24HR vaginal ring; Insert vaginally and leave in place for 3 consecutive weeks, then remove for 1 week.    Need for HPV vaccination  -     HPV Vaccine QuadriValent 3 Dose IM    Screening for cardiovascular condition  -     Comprehensive Metabolic Panel  -     TSH  -     Vitamin D 25 Hydroxy  -     CBC & Differential  -     Cholesterol, Total  -     HDL Cholesterol  -     CBC Auto Differential          Return in about 1 year (around 8/22/2020) for Annual physical, fasting.

## 2019-09-19 ENCOUNTER — TELEPHONE (OUTPATIENT)
Dept: FAMILY MEDICINE | Facility: CLINIC | Age: 49
End: 2019-09-19

## 2019-09-19 NOTE — TELEPHONE ENCOUNTER
[facility] phone call message-patient reporting a symptom:     Symptom: Diarrhea, body aches,  and vomiting since this morning. Patient also complaining of intermittent chest discomfort. Patient  Is requesting to speak to nurse in regards to this.   When did symptoms begin? This Morning  Characteristics: (location on body, intensity, what makes it better or worse, associated symptoms):    Additional Details:       Same Day Visit Offered: no    Additional comments:   OK to leave message on voice mail? Yes    Advised patient that RN would call back within 3 hours, unless emergent   Primary language: English      needed? No    Call taken on September 19, 2019 at 2:33 PM by Machelle Bhakta CMA

## 2019-09-19 NOTE — TELEPHONE ENCOUNTER
"RN took direct transfer from call center. Patient has had roughly 5-6 episodes of intermittent chest discomfort that she describes as an \"achy feeling\" that lasts a minute or two and goes away. She thought it was maybe anxiety. She endorses headache, and fatigue along with the chest and GI symptoms. When asked about shortness of breath she replied, \"not really\". Last night she had light sensitivity. It was hard for her to answer if she has arm pain due to the fact she aches everywhere currently. Denies fever.     Due to being unable to determine for certain if the GI symptoms are simply viral and the chest piece is anxiety and unrelated or if it is all combined a cardiac event, RN recommended an ER visit. RN advised that if she develops shortness of breath, specific arm pain, or worsening chest symptoms before leaving, to utilize 911. Patient verbalized understanding.    Routing to PCP as an FYI.  Pearl Aviles RN   "

## 2019-09-30 ENCOUNTER — HEALTH MAINTENANCE LETTER (OUTPATIENT)
Age: 49
End: 2019-09-30

## 2019-11-05 ENCOUNTER — OFFICE VISIT (OUTPATIENT)
Dept: FAMILY MEDICINE | Facility: CLINIC | Age: 49
End: 2019-11-05
Payer: COMMERCIAL

## 2019-11-05 VITALS
RESPIRATION RATE: 16 BRPM | BODY MASS INDEX: 22.79 KG/M2 | SYSTOLIC BLOOD PRESSURE: 110 MMHG | HEIGHT: 65 IN | DIASTOLIC BLOOD PRESSURE: 67 MMHG | HEART RATE: 64 BPM | TEMPERATURE: 98.3 F | OXYGEN SATURATION: 99 % | WEIGHT: 136.8 LBS

## 2019-11-05 DIAGNOSIS — R07.89 ATYPICAL CHEST PAIN: Primary | ICD-10-CM

## 2019-11-05 DIAGNOSIS — F41.0 PANIC ATTACK: ICD-10-CM

## 2019-11-05 RX ORDER — PSEUDOEPHEDRINE HCL 120 MG/1
120 TABLET, FILM COATED, EXTENDED RELEASE ORAL PRN
COMMUNITY

## 2019-11-05 RX ORDER — DIPHENHYDRAMINE HCL 25 MG
25 TABLET ORAL DAILY
COMMUNITY
End: 2023-02-06

## 2019-11-05 RX ORDER — MAGNESIUM OXIDE 400 MG/1
1 TABLET ORAL DAILY
COMMUNITY

## 2019-11-05 RX ORDER — FEXOFENADINE HCL 180 MG/1
180 TABLET ORAL PRN
COMMUNITY

## 2019-11-05 RX ORDER — CETIRIZINE HYDROCHLORIDE 10 MG/1
10 TABLET ORAL DAILY
COMMUNITY

## 2019-11-05 ASSESSMENT — MIFFLIN-ST. JEOR: SCORE: 1242.01

## 2019-11-05 NOTE — PROGRESS NOTES
MITALI       Laya is a 49 year old  female  who presents for follow up of concern(s) listed below:    Chief Complaint   Patient presents with     Hospital F/U     Is feeling better since leaving the hospital, is still having a little bit of chest pain on the left side on the breast toward the midline     Was recently hospitalized overnight at [facility] for chest pain. 9/19/2019. Underwent stress echo that was nl. Denies any recurrent symptoms.     Thinks she has not been drinking enough water most of her life. Has always had headaches, nausea when drinks black coffee.   Also struggles with sinus congestion and recurrent infections. 3 weeks ago she started drinking 64 oz of water a day and feels like all these are better.  Also may help her recurrent UTIs as well.     Recently lots of sinus issues and tooth infections. was on multpile antibiotics on the upper left maxilla.  Now is better.  Status post augmentin x 2, cleocin x 1 and pcn.     LLQ intermittent pain continues. She is having some left lower pains but thinks is more muscular in origin. Worst when she strains having a BM.     No periods. Had hysterectomy in 2009. Has one ovary.  At the time they thought was a tiny ovary.      MH: had a panic attack in the hospital. Last one was age 18.  Feels like she is managing her anxiety and not interested in meds. Is not depressed.       Meds:     Patient Active Problem List   Diagnosis     Endometriosis     Recurrent UTI     Abnormal Pap smear of cervix     Pelvic pain in female     Diverticulosis of large intestine without hemorrhage     Interstitial cystitis     Irritable bowel syndrome with both constipation and diarrhea       Current Outpatient Medications   Medication Sig Dispense Refill     cetirizine (ZYRTEC) 10 MG tablet Take 10 mg by mouth daily       diphenhydrAMINE (BENADRYL) 25 MG tablet Take 25 mg by mouth daily       fexofenadine (ALLEGRA) 180 MG tablet Take 180 mg by mouth as needed       ibuprofen  "(ADVIL/MOTRIN) 600 MG tablet Take 1 tablet (600 mg) by mouth every 8 hours 90 tablet 0     loratadine (CLARITIN) 10 MG tablet Take 10 mg by mouth daily       magnesium oxide (MAG-OX) 400 MG tablet Take 1 tablet by mouth daily       pseudoePHEDrine (SUDAFED) 120 MG 12 hr tablet Take 120 mg by mouth as needed       amoxicillin-clavulanate (AUGMENTIN) 500-125 MG per tablet Take 1 tablet by mouth 3 times daily       clindamycin (CLEOCIN) 300 MG capsule Take 300 mg by mouth every 8 hours  0     cyclobenzaprine (FLEXERIL) 5 MG tablet Take 1 tablet (5 mg) by mouth 3 times daily as needed for muscle spasms (Patient not taking: Reported on 6/12/2019) 42 tablet 0     mometasone (NASONEX) 50 MCG/ACT nasal spray Spray 2 sprays into both nostrils daily            Allergies   Allergen Reactions     Seasonal Allergies                 Review of Systems:                 Physical Exam:     Vitals:    11/05/19 1548   BP: 110/67   BP Location: Left arm   Patient Position: Sitting   Cuff Size: Adult Regular   Pulse: 64   Resp: 16   Temp: 98.3  F (36.8  C)   TempSrc: Oral   SpO2: 99%   Weight: 62.1 kg (136 lb 12.8 oz)   Height: 1.66 m (5' 5.35\")     Body mass index is 22.52 kg/m .  Vitals were reviewed and were normal  GENERAL: healthy, alert, well nourished, well hydrated, no distress  RESP: lungs clear to auscultation - no rales, no rhonchi, no wheezes  CV: regular rates and rhythm, normal S1 S2, no S3 or S4 and no murmur, no click or rub -    No results found for any visits on 11/05/19.       Assessment and Plan     Laya was seen today for hospital f/u.    Diagnoses and all orders for this visit:    Atypical chest pain - work up was negative. Reassured. Agree with her increase in water intake.     Panic attack - reviewed options.         Medications Discontinued During This Encounter   Medication Reason     nitroFURantoin macrocrystal-monohydrate (MACROBID) 100 MG capsule      cyclobenzaprine (FLEXERIL) 5 MG tablet      clindamycin " (CLEOCIN) 300 MG capsule      amoxicillin-clavulanate (AUGMENTIN) 500-125 MG per tablet                Options for treatment and follow-up care we25re reviewed with the patient . Laya Sainz  engaged in the decision making process and verbalized understanding of the options discussed and agreed with the final plan.    Barb Lemus MD

## 2019-11-06 NOTE — PATIENT INSTRUCTIONS
Here is the plan from today's visit    1. Atypical chest pain  Reassuring work up.     2. Panic attack  Get back to me if you would like to investigate treatment options      Please call or return to clinic if your symptoms don't go away.    Follow up plan  For CPE    Thank you for coming to [facility] today.  Lab Testing:  **If you had lab testing today and your results are reassuring or normal they will be mailed to you or sent through [facility] within 7 days.   **If the lab tests need quick action we will call you with the results.  The phone number we will call with results is # [phone] (home) [phone] (work). If this is not the best number please call our clinic and change the number.  Medication Refills:  If you need any refills please call your pharmacy and they will contact us.   If you need to  your refill at a new pharmacy, please contact the new pharmacy directly. The new pharmacy will help you get your medications transferred faster.   Scheduling:  If you have any concerns about today's visit or wish to schedule another appointment please call our office during normal business hours [phone] (8-5:00 M-F)  If a referral was made to a [facility] and you don't get a call from central scheduling please call [phone].  If a Mammogram was ordered for you at [facility] call [phone] to schedule or change your appointment.  If you had an XRay/CT/Ultrasound/MRI ordered the number is [phone] to schedule or change your radiology appointment.   Medical Concerns:  If you have urgent medical concerns please call [phone] at any time of the day.    Barb Lemus MD

## 2019-11-19 ENCOUNTER — OFFICE VISIT (OUTPATIENT)
Dept: FAMILY MEDICINE | Facility: CLINIC | Age: 49
End: 2019-11-19
Payer: COMMERCIAL

## 2019-11-19 ENCOUNTER — TELEPHONE (OUTPATIENT)
Dept: FAMILY MEDICINE | Facility: CLINIC | Age: 49
End: 2019-11-19

## 2019-11-19 VITALS
OXYGEN SATURATION: 100 % | WEIGHT: 130 LBS | BODY MASS INDEX: 21.4 KG/M2 | SYSTOLIC BLOOD PRESSURE: 133 MMHG | DIASTOLIC BLOOD PRESSURE: 88 MMHG | TEMPERATURE: 97.6 F | HEART RATE: 75 BPM

## 2019-11-19 DIAGNOSIS — Z13.9 SCREENING FOR CONDITION: ICD-10-CM

## 2019-11-19 DIAGNOSIS — N30.10 INTERSTITIAL CYSTITIS: Primary | ICD-10-CM

## 2019-11-19 DIAGNOSIS — R82.90 ABNORMAL URINALYSIS: ICD-10-CM

## 2019-11-19 DIAGNOSIS — R30.0 DYSURIA: ICD-10-CM

## 2019-11-19 DIAGNOSIS — N80.9 ENDOMETRIOSIS: ICD-10-CM

## 2019-11-19 LAB
BACTERIA: NORMAL
BILIRUBIN UR: NEGATIVE MG/DL
BLOOD UR: ABNORMAL MG/DL
CASTS: NORMAL /LPF
CRYSTAL URINE: NORMAL /LPF
EPITHELIAL CELLS UR: NORMAL /LPF (ref 0–2)
GLUCOSE URINE: NEGATIVE
KETONES UR QL: NEGATIVE MG/DL
LEUKOCYTE ESTERASE UR: ABNORMAL
MUCOUS URINE: NORMAL LPF
NITRITE UR QL STRIP: NEGATIVE MG/DL
PH UR STRIP: 7 [PH] (ref 4.5–8)
PROTEIN UR: NEGATIVE MG/DL
RBC URINE: NORMAL /HPF
SP GR UR STRIP: <=1.005 (ref 1–1.03)
UROBILINOGEN UR STRIP-ACNC: ABNORMAL E.U./DL
WBC URINE: <2 /HPF

## 2019-11-19 RX ORDER — NITROFURANTOIN 25; 75 MG/1; MG/1
100 CAPSULE ORAL 2 TIMES DAILY
Qty: 6 CAPSULE | Refills: 0 | Status: SHIPPED | OUTPATIENT
Start: 2019-11-19 | End: 2019-12-02

## 2019-11-19 RX ORDER — NAPROXEN 500 MG/1
500 TABLET ORAL 2 TIMES DAILY WITH MEALS
Qty: 60 TABLET | Refills: 0 | Status: SHIPPED | OUTPATIENT
Start: 2019-11-19 | End: 2019-12-19

## 2019-11-19 ASSESSMENT — ENCOUNTER SYMPTOMS
ABDOMINAL PAIN: 1
BLOOD IN STOOL: 0
HEMATURIA: 0
FREQUENCY: 0
DYSURIA: 1
FLANK PAIN: 0
RECTAL PAIN: 1

## 2019-11-19 NOTE — TELEPHONE ENCOUNTER
RN went to  to advise patient that she needs an office visit and we do not place lab orders for urine samples. She was placed into the 8:40 appointment and will be seen.  Pearl Aviles RN

## 2019-11-19 NOTE — PROGRESS NOTES
"       HPI       Laya Sainz is a 49 year old  who presents for   Chief Complaint   Patient presents with     UTI     frequent urination, small volumes of urine, denies pain or burning sensation.     Pelvic Pain     ongoing sharp pelvic pain for years, has gotten worse which started on 11/8/19.      Acute on chronic pelvic/bladder/perineal pain starting Nov 8  Glass-like pain, sharp. End of urination seems to bother her, but also feels much better after urination. Standing is trigger for pain. Burning in perineum. Currently no frequency, urgency like she has had in past. Currently no vaginal symptoms.    Has had these symptoms for years - though is interstitial cystitis.  Colonoscopy 3 years ago - nl.  Would have long periods where she feels well, and recently with significant increase in fluid intake most of symptoms went away.  In past has had flares with UTIs and vaginal infections.    Sees Dr. Lemus next in December.  Was hoping to drop off urine sample today only.    This time is different.  Pelvic floor feels \"fragile\" Like a bad period x 20.  More with standing up, shifting side to side.  Any kind of straining makes her scared for flare up.  Sometimes pain during BM - passing over something that hurts  Recent weeks lots of working out - core strengthening - lower abdomen to perineum, butt, left, left side of butt - pressure like sensation. Sometimes skin itchy (not vaginally), itchy burning feeling. Has had these predominant L-sided symptoms before. No skin changes.    Hysterectomy 2009 for endometriosis. Fibroids, ovarian cysts.  Reports in past has had imaging and says \"ovaries are post-menopausal\"    Problem, Medication and Allergy Lists were reviewed and updated if needed..    Patient is an established patient of this clinic..         Review of Systems:   Review of Systems   Gastrointestinal: Positive for abdominal pain and rectal pain. Negative for blood in stool.   Genitourinary: Positive for dysuria " and pelvic pain. Negative for flank pain, frequency, genital sores, hematuria, urgency and vaginal bleeding.            Physical Exam:     Vitals:    11/19/19 0905   BP: 133/88   BP Location: Left arm   Patient Position: Sitting   Cuff Size: Adult Regular   Pulse: 75   Temp: 97.6  F (36.4  C)   TempSrc: Oral   SpO2: 100%   Weight: 59 kg (130 lb)     Body mass index is 21.4 kg/m .  Vitals were reviewed and were normal     Physical Exam  Constitutional:       General: She is not in acute distress.     Appearance: She is well-developed. She is not diaphoretic.   HENT:      Head: Normocephalic and atraumatic.   Eyes:      Conjunctiva/sclera: Conjunctivae normal.   Neck:      Musculoskeletal: Normal range of motion.   Cardiovascular:      Rate and Rhythm: Normal rate.   Pulmonary:      Effort: Pulmonary effort is normal. No respiratory distress.   Abdominal:      General: There is no distension.      Palpations: Abdomen is soft.      Tenderness: There is abdominal tenderness (lower abdomen).   Musculoskeletal: Normal range of motion.   Neurological:      Mental Status: She is alert and oriented to person, place, and time.   Psychiatric:      Comments: Tearful           Results:   Results are ordered and pending    Assessment and Plan        Laya was seen today for uti and pelvic pain.    Diagnoses and all orders for this visit:    Interstitial cystitis  Ongoing pelvic pain 2/2 interstitial cystitis. Acute worsening today, with UA with trace LE, trace blood. DDx include UTI including non-gonococcal urethritis, chemical urethritis, ongoing pain 2/2 endometriosis lesions. Overall trigger avoidance and fluid intake changes have been somewhat successful at managing discomfort.   - Continue symptom tracking, trigger avoidance  - Commence with Pelvic PT  - Can use naproxen instead of ibuprofen for longer duration of effect, especially given benefit with NSAIDs and hx of endometriosis  - Will f/u UCx, micro, ureaplasma PCR  for non-gonococcal urethritis - did recommend that we hold off on antibiotics until cultures result given her complicated history with UTIs. Patient strongly desires to have rx immediately given significant discomfort. Settled on me sending rx for macrobid to pharmacy now, but hold off on taking as long as she can in anticipation of culture results.   - Will follow-up with Dr. Lemus in December for ongoing monitoring, management of interstitial cystitis.  -     naproxen (NAPROSYN) 500 MG tablet; Take 1 tablet (500 mg) by mouth 2 times daily (with meals)    Dysuria  -     Urine Culture Aerobic Bacterial  -     Ureaplasma species PCR  -     naproxen (NAPROSYN) 500 MG tablet; Take 1 tablet (500 mg) by mouth 2 times daily (with meals)  -     nitroFURantoin macrocrystal-monohydrate (MACROBID) 100 MG capsule; Take 1 capsule (100 mg) by mouth 2 times daily for 3 days    Endometriosis    Abnormal urinalysis  -     nitroFURantoin macrocrystal-monohydrate (MACROBID) 100 MG capsule; Take 1 capsule (100 mg) by mouth 2 times daily for 3 days           Medications Discontinued During This Encounter   Medication Reason     ibuprofen (ADVIL/MOTRIN) 600 MG tablet        Options for treatment and follow-up care were reviewed with the patient. Laya Sainz  engaged in the decision making process and verbalized understanding of the options discussed and agreed with the final plan.    Zulma Quintanilla MD

## 2019-11-19 NOTE — PROGRESS NOTES
Preceptor Attestation:   Patient seen, evaluated and discussed with the resident. I have verified the content of the note, which accurately reflects my assessment of the patient and the plan of care.   Supervising Physician:  Cheng Miller MD.

## 2019-11-19 NOTE — TELEPHONE ENCOUNTER
"[facility] phone call message-patient reporting a symptom:     Symptom: Possible UTI    When did symptoms begin? UNKNOWN    Characteristics: (location on body, intensity, what makes it better or worse, associated symptoms):      Additional Details: Patient is expericing symptoms of a UTI. Author attempted to inquire type of symptoms, patient declined to disclose. Patient presenting at clinic to leave a urine sample. Author informed patient that an order is needed before a sample can be dropped off, therefore author offered a 8:40AM appointment for patient to be seen but patient declined. Per patient \"I always call and they let me drop off my urine sample whenever I want, I've never had to have a doctors visit to do this.\" Please advise.     Same Day Visit Offered: Yes, declined    Additional comments: Please place lab order for a UA as soon as possible.     OK to leave message on voice mail? Yes    Advised patient that RN would call back within 3 hours, unless emergent   Primary language: English      needed? No    Call taken on November 19, 2019 at 8:26 AM by Emma Lindsey                "

## 2019-11-20 ENCOUNTER — TELEPHONE (OUTPATIENT)
Dept: FAMILY MEDICINE | Facility: CLINIC | Age: 49
End: 2019-11-20
Payer: COMMERCIAL

## 2019-11-20 LAB
BACTERIA SPEC CULT: NO GROWTH
Lab: NORMAL
SPECIMEN SOURCE: NORMAL

## 2019-11-20 RX ORDER — NITROFURANTOIN 25; 75 MG/1; MG/1
100 CAPSULE ORAL 2 TIMES DAILY
Qty: 4 CAPSULE | Refills: 0 | Status: SHIPPED | OUTPATIENT
Start: 2019-11-22 | End: 2019-12-02

## 2019-11-20 NOTE — TELEPHONE ENCOUNTER
"Received transfer call from  and spoke with the patient-Patient reported her symptoms is getting \"better not worse\",  Stating, the she attempting to pro-active and would like to follow up for status up date regarding lab results notified that request to address, lab results will be routed to provider, Patient was last seen in clinic on 11/19/2019, notified about the plan of care,  discussed with the patient by provider last seen in clinic. \"To avoid discomfort trigers and take fluid\", Bertram TELLES. Patient verbalized understanding, voiced some concerns regards day supplies of prescription given, notified that provider intentional may have given medication to last 3 days-Till 11/21/2019, pending Urine culture results, patient verbalized requested a follow up telephone call once provider reviews, lab results and revises treatment plan if appropriate.     Message routed to Provider last seen in clinic to review lab result/Treatment plan and address if appropriate.    Best number to return phone call to patient: [phone]     Afshan Law RN      "

## 2019-11-20 NOTE — TELEPHONE ENCOUNTER
[facility] phone call message- medication clarification/question:    Full Medication Name: UNKNOWN   Dose: UNKNOWN    Question/Clarification needed: Patient seen in clinic 11/19/19 and prescribed nitroFURantoin macrocrystal-monohydrate (MACROBID) 100 MG capsule for possible UTI. Patient stated Dr. Quintanilla wanted to wait and see how the lab culture grew before prescribing another medication. Patient is concerned about running out of nitroFURantoin macrocrystal-monohydrate (MACROBID) 100 MG capsule before Dr. Quintanilla can prescribe another medication to treat symptoms. Patient also stated she is experiencing immense discomfort and pain. Please advise.     Pharmacy confirmed as   [facility][id number] [address] AT [address] [address]  Phone: [phone] Fax: [phone]  : Yes    Please leave ONLY preferred pharmacy    OK to leave a message on voice mail? Yes    Advised patient that RN would call back within 3 hours, unless emergent.    Primary language: English      needed? No    Call taken on November 20, 2019 at 1:44 PM by Emma Kelly to [facility]

## 2019-11-20 NOTE — TELEPHONE ENCOUNTER
Call returned to patient. No answer, VM left.  Additional rx for full 5 days macrobid.  Will update with culture results when available.    Zulma Quintanilla MD

## 2019-11-21 LAB
SPECIMEN SOURCE: NORMAL
U PARVUM DNA SPEC QL NAA+PROBE: NEGATIVE
U UREALYTICUM DNA SPEC QL NAA+PROBE: NEGATIVE

## 2019-12-02 ENCOUNTER — OFFICE VISIT (OUTPATIENT)
Dept: FAMILY MEDICINE | Facility: CLINIC | Age: 49
End: 2019-12-02
Payer: COMMERCIAL

## 2019-12-02 VITALS
BODY MASS INDEX: 22.05 KG/M2 | HEIGHT: 66 IN | WEIGHT: 137.2 LBS | OXYGEN SATURATION: 100 % | HEART RATE: 56 BPM | DIASTOLIC BLOOD PRESSURE: 75 MMHG | SYSTOLIC BLOOD PRESSURE: 118 MMHG | RESPIRATION RATE: 16 BRPM | TEMPERATURE: 97.4 F

## 2019-12-02 DIAGNOSIS — Z00.00 ROUTINE GENERAL MEDICAL EXAMINATION AT A HEALTH CARE FACILITY: Primary | ICD-10-CM

## 2019-12-02 DIAGNOSIS — N89.8 VAGINAL IRRITATION: ICD-10-CM

## 2019-12-02 DIAGNOSIS — Z13.9 SCREENING FOR CONDITION: ICD-10-CM

## 2019-12-02 DIAGNOSIS — N30.10 INTERSTITIAL CYSTITIS: ICD-10-CM

## 2019-12-02 LAB
CHOLEST SERPL-MCNC: 232.4 MG/DL (ref 0–200)
CHOLEST/HDLC SERPL: 3.2 {RATIO} (ref 0–5)
FSH SERPL-ACNC: 112.9 IU/L
HDLC SERPL-MCNC: 73.6 MG/DL
LDLC SERPL CALC-MCNC: 142 MG/DL (ref 0–129)
TRIGL SERPL-MCNC: 82 MG/DL (ref 0–150)
VLDL CHOLESTEROL: 16.4 MG/DL (ref 7–32)

## 2019-12-02 ASSESSMENT — PAIN SCALES - GENERAL: PAINLEVEL: EXTREME PAIN (8)

## 2019-12-02 ASSESSMENT — MIFFLIN-ST. JEOR: SCORE: 1250.09

## 2019-12-02 NOTE — PROGRESS NOTES
Female Physical Note          HPI         Concerns today:     Has had 2 weeks of LLQ pain that ws very sharp to start with and then felt like labor pains.    Is getting a bit better.   Pain worse when she stands, is left inguinal and then moves all the way back to her sacrum.  Finds it worst when she moves.  The pain moves as well.   Found less dysuria and more pain at the end of voiding.  Also did not have urinating issues. Never had bowel changes.   She has cut out caffeine and alcohol and notes no real change  She did take the antibiotics but it took awhile for that to help.  Off the antibiotics now for 11 days.  Still feels a few tinges    Saw chiropracter who says her psoas is all tensed up, he went right to the spot. Also thinks her tail bone might be the issue as well. She referred to a pelvic floor PT.     Hysterectomy in 2009.  Has had hot flashes for years and flushing. Feels like it happens for 10 years.    Not sure when her mom went through menopause - thinks it was on track.  No vaginal dryness but intercourse does not feel as good. Does not need to use lubrication.     Also has had antibiotics for the last 6 months due to her sinus and her dental infections. Has maybe some yeast like symptoms and also thought she might have had BV intermittently.     Prevention: is planning on getting the Shingrix. Otherwise up to date.     FHx: daughter with another autoimmune worsening      Patient Active Problem List   Diagnosis     Endometriosis     Recurrent UTI     Abnormal Pap smear of cervix     Pelvic pain in female     Diverticulosis of large intestine without hemorrhage     Interstitial cystitis     Irritable bowel syndrome with both constipation and diarrhea       History reviewed. No pertinent past medical history.    Previous Medical Care        Family History   Problem Relation Age of Onset     Thyroid Disease Mother      Asthma Father      Thyroid Disease Daughter      Sjogren's Daughter      Lupus  Daughter      Diabetes No family hx of      Coronary Artery Disease No family hx of      Hypertension No family hx of      Hyperlipidemia No family hx of      Cerebrovascular Disease No family hx of      Breast Cancer No family hx of      Prostate Cancer No family hx of      Colon Cancer No family hx of      Other Cancer No family hx of      Depression No family hx of      Anxiety Disorder No family hx of      Mental Illness No family hx of      Substance Abuse No family hx of      Anesthesia Reaction No family hx of      Osteoporosis No family hx of      Genetic Disorder No family hx of      Obesity No family hx of      Unknown/Adopted No family hx of               Review of Systems:     Review of Systems:  CONSTITUTIONAL: NEGATIVE for fever, chills, change in weight  INTEGUMENTARY/SKIN: NEGATIVE for worrisome rashes, moles or lesions  EYES: NEGATIVE for vision changes or irritation  ENT/MOUTH: frequent sinus infections  RESP: NEGATIVE for significant cough or SOB  BREAST: NEGATIVE for masses, tenderness or discharge  CV: NEGATIVE for chest pain, palpitations or peripheral edema  GI: NEGATIVE for nausea, abdominal pain, heartburn, or change in bowel habits  : see above  MUSCULOSKELETAL: NEGATIVE for significant arthralgias or myalgia  NEURO: NEGATIVE for weakness, dizziness or paresthesias  ENDOCRINE: NEGATIVE for temperature intolerance, skin/hair changes  HEME/ALLERGY: NEGATIVE for bleeding problems  PSYCHIATRIC: NEGATIVE for changes in mood or affect  Sleep:   Do you snore most or the night (as reported by a family member)? No  Do you feel sleepy or extremely tired during most of the day? No             Social History     Social History     Socioeconomic History     Marital status:      Spouse name: Not on file     Number of children: Not on file     Years of education: Not on file     Highest education level: Not on file   Occupational History     Not on file   Social Needs     Financial resource  strain: Not on file     Food insecurity:     Worry: Not on file     Inability: Not on file     Transportation needs:     Medical: Not on file     Non-medical: Not on file   Tobacco Use     Smoking status: Former Smoker     Packs/day: 0.00     Last attempt to quit: 10/4/2004     Years since quitting: 15.1     Smokeless tobacco: Never Used   Substance and Sexual Activity     Alcohol use: Yes     Comment: 2 drinks once a week     Drug use: No     Sexual activity: Yes     Partners: Male     Birth control/protection: None   Lifestyle     Physical activity:     Days per week: Not on file     Minutes per session: Not on file     Stress: Not on file   Relationships     Social connections:     Talks on phone: Not on file     Gets together: Not on file     Attends Druze service: Not on file     Active member of club or organization: Not on file     Attends meetings of clubs or organizations: Not on file     Relationship status: Not on file     Intimate partner violence:     Fear of current or ex partner: Not on file     Emotionally abused: Not on file     Physically abused: Not on file     Forced sexual activity: Not on file   Other Topics Concern     Parent/sibling w/ CABG, MI or angioplasty before 65F 55M? Not Asked   Social History Narrative     Not on file           Has anyone hurt you physically, for example by pushing, hitting, slapping or kicking you or forcing you to have sex? Denies  Do you feel threatened or controlled by a partner, ex-partner or anyone in your life? Denies    Sexual Health     Sexual concerns: Yes - finding that she is not enjoying sex since she is so worried about potential introduction of bacteria.    Pregnancy History:   LMP No LMP recorded (lmp unknown). Patient has had a hysterectomy.   Last Pap Smear Date:   Lab Results   Component Value Date    PAP ASC-US 2017    PAP ASC-H 2015     Abnormal Pap History: see above    Recommended Screening     Cholesterol Level (>46 yo  "or at risk):  Recommended and patient accepted testing. and Pap every 3 years for women 21-29. Testing not indicated   Colon CA Screening (>50-75 ):  Testing not indicated  and Breast CA Screening (>39 yo or 10 y before 1st degree relative diagnosis): Testing not indicated            Physical Exam:     Vitals: /75   Pulse 56   Temp 97.4  F (36.3  C) (Oral)   Resp 16   Ht 1.67 m (5' 5.75\")   Wt 62.2 kg (137 lb 3.2 oz)   LMP  (LMP Unknown)   SpO2 100%   Breastfeeding No   BMI 22.31 kg/m    BMI= Body mass index is 22.31 kg/m .   GENERAL: healthy, alert and no distress  EYES: Eyes grossly normal to inspection, extraocular movements - intact, and PERRL  HENT: ear canals- normal; TMs- normal; Nose- normal; Mouth- no ulcers, no lesions  NECK: no tenderness, no adenopathy, no asymmetry, no masses, no stiffness; thyroid- normal to palpation  RESP: lungs clear to auscultation - no rales, no rhonchi, no wheezes  BREAST: no masses, no tenderness, no nipple discharge, no palpable axillary masses or adenopathy  CV: regular rates and rhythm, normal S1 S2, no S3 or S4 and no murmur, no click or rub -  ABDOMEN: soft, no tenderness, no  hepatosplenomegaly, no masses, normal bowel sounds  MS: extremities- no gross deformities noted, no edema  SKIN: no suspicious lesions, no rashes  NEURO: strength and tone- normal, sensory exam- grossly normal, mentation- intact, speech- normal, reflexes- symmetric  BACK: no CVA tenderness, no paralumbar tenderness  - female: cervix- normal, adnexae- normal; uterus- normal, no masses, no discharge  RECTAL- female: no masses, no hemorrhoids  PSYCH: Alert and oriented times 3; speech- coherent , normal rate and volume; able to articulate logical thoughts, able to abstract reason, no tangential thoughts, no hallucinations or delusions, affect- normal  LYMPHATICS: ant. cervical- normal, post. cervical- normal, axillary- normal, supraclavicular- normal, inguinal- normal      Assessment and " Plan      Laya was seen today for physical.    Diagnoses and all orders for this visit:    Routine general medical examination at a health care facility - up to date. Vaccines today.   -     Lipid Cascade ([facility])    Vaginal irritation - she is possibly post menopausal. Vaginal exam with no signs of atrophy. At this point, I do not think that vaginal estrogen will necessarily help her pelvic symptoms.   -     Follicle stimulating hormone    Screening for condition  -     TDAP VACCINE (BOOSTRIX)    Interstitial cystitis - reviewed with her that she likely did not have UTI and that the Macrobid likely did not make things better. Goal is to not use antibiotics. Acknowledged that she has had persistent LE+ UAs and so may need to see Urology for a cystoscopy.  She will think about it.         Options for treatment and follow-up care were reviewed with the patient . Laya A Hand and/or guardian engaged in the decision making process and verbalized understanding of the options discussed and agreed with the final plan.    Barb Lemus MD

## 2019-12-02 NOTE — PATIENT INSTRUCTIONS
Preventive Health Recommendations  Female Ages 50 - 64    Yearly exam: See your health care provider every year in order to  o Review health changes.   o Discuss preventive care.    o Review your medicines if your doctor has prescribed any.      Get a Pap test every three years (unless you have an abnormal result and your provider advises testing more often).    If you get Pap tests with HPV test, you only need to test every 5 years, unless you have an abnormal result.     You do not need a Pap test if your uterus was removed (hysterectomy) and you have not had cancer.    You should be tested each year for STDs (sexually transmitted diseases) if you're at risk.     Have a mammogram every 1 to 2 years.    Have a colonoscopy at age 50, or have a yearly FIT test (stool test). These exams screen for colon cancer.      Have a cholesterol test every 5 years, or more often if advised.    Have a diabetes test (fasting glucose) every three years. If you are at risk for diabetes, you should have this test more often.     If you are at risk for osteoporosis (brittle bone disease), think about having a bone density scan (DEXA).    Shots: Get a flu shot each year. Get a tetanus shot every 10 years.    Nutrition:     Eat at least 5 servings of fruits and vegetables each day.    Eat whole-grain bread, whole-wheat pasta and brown rice instead of white grains and rice.    Get adequate Calcium and Vitamin D.     Lifestyle    Exercise at least 150 minutes a week (30 minutes a day, 5 days a week). This will help you control your weight and prevent disease.    Limit alcohol to one drink per day.    No smoking.     Wear sunscreen to prevent skin cancer.     See your dentist every six months for an exam and cleaning.    See your eye doctor every 1 to 2 years.    Here is the plan from today's visit    1. Routine general medical examination at a health care facility  Up to date on prevention     2. Vaginal irritation  I will get back to  you on the hormone level. At this point I do not think you need estrogen based on your exam, but may find more irritation in the future.   Get back to me if those symptoms return and we will refer you to Urology. And continue with the floor PT.   - Follicle stimulating hormone      Please call or return to clinic if your symptoms don't go away.    Follow up plan  As needed     Thank you for coming to [facility] today.  Lab Testing:  **If you had lab testing today and your results are reassuring or normal they will be mailed to you or sent through [employer] within 7 days.   **If the lab tests need quick action we will call you with the results.  The phone number we will call with results is # [phone] (home) [phone] (work). If this is not the best number please call our clinic and change the number.  Medication Refills:  If you need any refills please call your pharmacy and they will contact us.   If you need to  your refill at a new pharmacy, please contact the new pharmacy directly. The new pharmacy will help you get your medications transferred faster.   Scheduling:  If you have any concerns about today's visit or wish to schedule another appointment please call our office during normal business hours [phone] (8-5:00 M-F)  If a referral was made to a [facility] and you don't get a call from central scheduling please call [phone].  If a Mammogram was ordered for you at [facility] call [phone] to schedule or change your appointment.  If you had an XRay/CT/Ultrasound/MRI ordered the number is [phone] to schedule or change your radiology appointment.   Medical Concerns:  If you have urgent medical concerns please call [phone] at any time of the day.    Barb Lemus MD

## 2020-01-13 ENCOUNTER — TRANSFERRED RECORDS (OUTPATIENT)
Dept: HEALTH INFORMATION MANAGEMENT | Facility: CLINIC | Age: 50
End: 2020-01-13

## 2020-01-20 ENCOUNTER — THERAPY VISIT (OUTPATIENT)
Dept: PHYSICAL THERAPY | Facility: CLINIC | Age: 50
End: 2020-01-20
Payer: COMMERCIAL

## 2020-01-20 DIAGNOSIS — R10.2 PELVIC PAIN IN FEMALE: Primary | ICD-10-CM

## 2020-01-20 PROCEDURE — 97535 SELF CARE MNGMENT TRAINING: CPT | Mod: GP | Performed by: PHYSICAL THERAPIST

## 2020-01-20 PROCEDURE — 97162 PT EVAL MOD COMPLEX 30 MIN: CPT | Mod: GP | Performed by: PHYSICAL THERAPIST

## 2020-01-20 PROCEDURE — 97110 THERAPEUTIC EXERCISES: CPT | Mod: GP | Performed by: PHYSICAL THERAPIST

## 2020-01-20 PROCEDURE — 97112 NEUROMUSCULAR REEDUCATION: CPT | Mod: GP | Performed by: PHYSICAL THERAPIST

## 2020-01-20 NOTE — PROGRESS NOTES
[facility] Initial Evaluation  Subjective:    Laya Sainz being seen for pelvic/abdominal pain.   Problem occurred: on going for 8+ years.  Hurts worse than ever has before for 2 months starting 11-8-19. Pain is sharp and or feels like labor contractions  General health as reported by patient is good. Pertinent medical history includes:  Changes in bowel/bladder and fibromyalgia.    Surgeries include:  Other. Other surgery history details: 3 laser surgeries for endometriosis approx 1992, 2001, 2009 (hysterectomy too . I still have left ovary and cervix.)..  Current medications:  Anti-inflammatory and muscle relaxants.   Primary job tasks include:  Computer work, lifting/carrying and prolonged standing.  Pain is described as aching, burning, cramping, sharp, shooting, stabbing and other and is intermittent. Pain is worse during the day. Since onset symptoms are gradually improving. Special tests:  MRI (see below).      Restrictions include:  Working in normal job without restrictions.    Barriers include:  None as reported by patient.  Red flags:  Changes in bowel/bladder.  Type of problem:  Pelvic dysfunction   Condition occurred with:  Insidious onset. This is a recurrent condition   Problem details: Sharp pelvic pain. Pain radiates from posterior through pelvis to the groin.  Had worse flare up on 11/8/2019.  Helped friend move 2 couches.  Also works with a . Pain is worse on the left side.  Worse with working out.  Gets groin pain on the left Had rectal and bladder pain with rectal exam.  Has not  Had intercourse for 2 months.  Pain is better in the urethra and bladder as a result.  Has had intermittent UTI's, perhaps 2 times a year.  Has had a lot of anti-biotics for tooth pain recently. MRI of back shows Left L5-S1 facet inflammatory arthropathy.  No MRI of hips.  Straining with bowel movements irritates.  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  [place]  Likes to  walk 2-3 miles for exercise.  Meets with  at a home studio for lifting.  Stretches on her own.  Used to do yoga. Conscious about being tight and holding. Had hysterectomy and 3 laser surgeries for endometriosis.  Still has left ovary.  Had some pain with endometriosis.  .   Patient reports pain:  Lumbar spine left. Radiates to:  Groin left and coccyx.  Symptoms are exacerbated by intercourse and stress and relieved by NSAID's and muscle relaxants.                      Objective:  System                                 Pelvic Dysfunction Evaluation:    Bladder/Pelvic Problems:  Pain with intercourse if too deep.    Storage Problem:  Urge incontinence, urgency and mixed incontinence    Dyspareunia:  Grade 1    Diagnostic Tests:    Pelvic Exam:  X                      Flexibility:  normal      Abdominal Wall:        Scar Mobility:  Fair, left lower quadrant, painful  Pelvic Clock Exam:  Pelvic clock exam: Obturator on the left.        Levator ANI:  +    SI Provocation:  NA        Reflex Testing:  normal    External Assessment:      Scars:  Tender        Muscle Contraction/Perineal Mobility:  Slight lift, no urogential triangle descent  Internal Assessment:      Contraction/Grade:  Fair squeeze, definite lift (3)          SEMG Biofeedback:    Equipment:  MR20 with computer    Suraface electrode placement--Perianal:  Difficulty relaxing          EMG interpretation to fatigue:  Less than3 seconds  Additional History:  Delivery History:  Vaginal delivery, tearing and episiotomies    Number of Live Births: 2            Hip Evaluation    Hip Strength:  : drcreased strength on the left with gluteal medius in weight bearing and non wb.                                         General     ROS    Assessment/Plan:    Patient is a 49 year old female with pelvic complaints.    Patient has the following significant findings with corresponding treatment plan.                Diagnosis 1:  Pelvic jules  Pain -  self management,  education and home program  Decreased ROM/flexibility - manual therapy, therapeutic exercise and home program  Decreased strength - therapeutic exercise, therapeutic activities and home program  Impaired muscle performance - biofeedback, neuro re-education and home program  Decreased function - therapeutic activities and home program    Therapy Evaluation Codes:   1) History comprised of:   Personal factors that impact the plan of care:      Time since onset of symptoms.    Comorbidity factors that impact the plan of care are:      endometriosis.     Medications impacting care: None.  2) Examination of Body Systems comprised of:   Body structures and functions that impact the plan of care:      Pelvis.   Activity limitations that impact the plan of care are:      Standing, Walking, Pelvic Exam, [place] and Stress incontinence.  3) Clinical presentation characteristics are:   Evolving/Changing.  4) Decision-Making    Moderate complexity using standardized patient assessment instrument and/or measureable assessment of functional outcome.  Cumulative Therapy Evaluation is: Moderate complexity.    Previous and current functional limitations:  (See Goal Flow Sheet for this information)    Short term and Long term goals: (See Goal Flow Sheet for this information)     Communication ability:  Patient appears to be able to clearly communicate and understand verbal and written communication and follow directions correctly.  Treatment Explanation - The following has been discussed with the patient:   RX ordered/plan of care  Anticipated outcomes  Possible risks and side effects  This patient would benefit from PT intervention to resume normal activities.   Rehab potential is excellent.    Frequency:  2 X a month, once daily  Duration:  for 6 months  Discharge Plan:  Achieve all LTG.  Independent in home treatment program.  Reach maximal therapeutic benefit.    Please refer to the daily flowsheet for treatment today, total  treatment time and time spent performing 1:1 timed codes.

## 2020-01-20 NOTE — LETTER
[facility]  [id number]  [facility] [id number] [phone]    2020  Re: Laya Sainz   :   1970  MRN:  [id number]   REFERRING PHYSICIAN:   Karly JARQUIN    [facility]    Date of Initial Evaluation:  2020    Visits:  Rxs Used: 1  Reason for Referral:  Pelvic pain in female    EVALUATION SUMMARY    [facility]  Subjective:    Laya Sainz being seen for pelvic/abdominal pain.   Problem occurred: on going for 8+ years.  Hurts worse than ever has before for 2 months starting 19. Pain is sharp and or feels like labor contractions  General health as reported by patient is good. Pertinent medical history includes:  Changes in bowel/bladder and fibromyalgia.    Surgeries include:  Other. Other surgery history details: 3 laser surgeries for endometriosis approx , ,  (hysterectomy too . I still have left ovary and cervix.)..  Current medications:  Anti-inflammatory and muscle relaxants.   Primary job tasks include:  Computer work, lifting/carrying and prolonged standing.  Pain is described as aching, burning, cramping, sharp, shooting, stabbing and other and is intermittent. Pain is worse during the day. Since onset symptoms are gradually improving. Special tests:  MRI (see below).      Restrictions include:  Working in normal job without restrictions.  Barriers include:  None as reported by patient.  Red flags:  Changes in bowel/bladder.  Type of problem:  Pelvic dysfunction   Condition occurred with:  Insidious onset. This is a recurrent condition   Problem details: Sharp pelvic pain. Pain radiates from posterior through pelvis to the groin.  Had worse flare up on 2019.  Helped friend move 2 couches.  Also works with a . Pain is worse on the left side.  Worse with working out.  Gets groin pain on  the left Had rectal and bladder pain with rectal exam.  Has not  Had intercourse for 2 months.  Pain is better in the urethra and bladder as a result.  Has had intermittent UTI's, perhaps 2 times a year.  Has had a lot of anti-biotics for tooth pain recently. MRI of back shows Left L5-S1 facet inflammatory arthropathy.  No MRI of hips.  Straining with bowel movements irritates.  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  [place]  Likes to walk 2-3 miles for exercise.  Meets with  at a home studio for lifting.  Stretches on her own.  Used to do yoga. Conscious about being tight and holding. Had hysterectomy and 3 laser surgeries for endometriosis.  Still has left ovary.  Had some pain with endometriosis.  .   Patient reports pain:  Lumbar spine left. Radiates to:  Groin left and coccyx.  Symptoms are exacerbated by intercourse and stress and relieved by NSAID's and muscle relaxants.    Objective:  System  Pelvic Dysfunction Evaluation:    Bladder/Pelvic Problems:  Pain with intercourse if too deep.    Storage Problem:  Urge incontinence, urgency and mixed incontinence  Dyspareunia:  Grade 1    Diagnostic Tests:    Pelvic Exam:  X  Flexibility:  normal  Abdominal Wall:    Scar Mobility:  Fair, left lower quadrant, painful  Pelvic Clock Exam:  Pelvic clock exam: Obturator on the left.  Levator ANI:  +  SI Provocation:  NA  Reflex Testing:  normal  External Assessment:    Scars:  Tender  Muscle Contraction/Perineal Mobility:  Slight lift, no urogential triangle descent  Internal Assessment:    Contraction/Grade:  Fair squeeze, definite lift (3)  SEMG Biofeedback:    Equipment:  MR20 with computer  Suraface electrode placement--Perianal:  Difficulty relaxing  EMG interpretation to fatigue:  Less than3 seconds  Additional History:  Delivery History:  Vaginal delivery, tearing and episiotomies  Number of Live Births: 2  Hip Evaluation  Hip Strength:  : drcreased strength on the left with gluteal medius in  weight bearing and non wb.  Assessment/Plan:    Patient is a 49 year old female with pelvic complaints.    Patient has the following significant findings with corresponding treatment plan.                Diagnosis 1:  Pelvic jules  Pain -  self management, education and home program  Decreased ROM/flexibility - manual therapy, therapeutic exercise and home program  Decreased strength - therapeutic exercise, therapeutic activities and home program  Impaired muscle performance - biofeedback, neuro re-education and home program  Decreased function - therapeutic activities and home program  Therapy Evaluation Codes:   1) History comprised of:   Personal factors that impact the plan of care:      Time since onset of symptoms.    Comorbidity factors that impact the plan of care are:      endometriosis.     Medications impacting care: None.  2) Examination of Body Systems comprised of:   Body structures and functions that impact the plan of care:      Pelvis.   Activity limitations that impact the plan of care are:      Standing, Walking, Pelvic Exam, [place] and Stress incontinence.  3) Clinical presentation characteristics are:   Evolving/Changing.  4) Decision-Making    Moderate complexity using standardized patient assessment instrument and/or measureable assessment of functional outcome.  Cumulative Therapy Evaluation is: Moderate complexity.  Previous and current functional limitations:  (See Goal Flow Sheet for this information)    Short term and Long term goals: (See Goal Flow Sheet for this information)   Communication ability:  Patient appears to be able to clearly communicate and understand verbal and written communication and follow directions correctly.  Treatment Explanation - The following has been discussed with the patient:   RX ordered/plan of care  Anticipated outcomes  Possible risks and side effects  This patient would benefit from PT intervention to resume normal activities.   Rehab potential is  excellent.    Frequency:  2 X a month, once daily  Duration:  for 6 months  Discharge Plan:  Achieve all LTG.  Independent in home treatment program.  Reach maximal therapeutic benefit.      Thank you for your referral.    INQUIRIES  Therapist:Leyda Cast PT, [facility]  [facility] PHYSICAL THERAPY  [address] [place], [place] [address]OR [facility] [id number]  [facility] [address]  Phone: [phone]  Fax: [phone]

## 2020-01-26 NOTE — PROGRESS NOTES
MITALI       Laya is a 49 year old  female  who presents for follow up of concern(s) listed below:    Chief Complaint   Patient presents with     RECHECK     chronic condition-pt had MRI and would like some lab work done regarding some pain ongoing for many years with inflammation     Years of intermittent left sided pelvic pain and interstitial cystitis who had exacerbation starting 11/8.  Been dealing with this since then but recently much better since starting PT and not having penetrative vaginal sex for 2 months.  Wanting me aware of what has happened.   Saw her former orthopedist who did MRI and noted L5-S1 inflammed facet joint on the left.  Also saw colorectal surgeon who noted tense left sided muscles when doing digital rectal exam and touching that area triggered her left pelvic pain. The pain runs from her left SI area down along the perineum and then into the left anterior pelvis.  She started PT and they too note tight muscles.   Worse with constipation and diarrhea so there is rectal symptomatology as well.    Worse when up and about walking all day.   Best when waking up in the am.    During this time she also restricted gluten without any symptom relief other than decreased constipation and the constiption triggers her pain too.      Her daughter has Sjogrens and Lupus that was difficult to diagnose. Started around 9 and not diagnosed until 28.   She wonders why the joint on her MRI is inflammed and does she need work up.   She has had fleeting joint pains - mostly wrist, left more than right, hands and possibly the right knee.     Hands and knees - lasts a day or so.  Or even a half day.  And also wrist pain when she rests on it.  Also finds that her MCP joints hurt.  And wrists.  Achiness has been going on 2 - 3 years. Very sporadic.   Knees was likely right one and got better after popped at 4 days.   Ams - not stiff in ams.  Gels a bit but gets better fast.   No history of uveitis  In her  "20s had puffy marks on her malar face. Then had that again and her doc at the time thought it could be lupus.   No lab work done per us on rheum    Patient Active Problem List   Diagnosis     Endometriosis     Recurrent UTI     Abnormal Pap smear of cervix     Pelvic pain in female     Diverticulosis of large intestine without hemorrhage     Interstitial cystitis     Irritable bowel syndrome with both constipation and diarrhea       Current Outpatient Medications   Medication Sig Dispense Refill     cetirizine (ZYRTEC) 10 MG tablet Take 10 mg by mouth daily       cyclobenzaprine (FLEXERIL) 5 MG tablet Take 5 mg by mouth as needed       diphenhydrAMINE (BENADRYL) 25 MG tablet Take 25 mg by mouth daily       fexofenadine (ALLEGRA) 180 MG tablet Take 180 mg by mouth as needed       ibuprofen (ADVIL/MOTRIN) 200 MG capsule Take 200 mg by mouth every 4 hours as needed for fever       loratadine (CLARITIN) 10 MG tablet Take 10 mg by mouth daily       magnesium oxide (MAG-OX) 400 MG tablet Take 1 tablet by mouth daily       pseudoePHEDrine (SUDAFED) 120 MG 12 hr tablet Take 120 mg by mouth as needed            Allergies   Allergen Reactions     Seasonal Allergies                 Review of Systems:                 Physical Exam:     Vitals:    01/27/20 1001   BP: 127/81   BP Location: Left arm   Patient Position: Sitting   Cuff Size: Adult Regular   Pulse: 65   Resp: 16   Temp: 97.8  F (36.6  C)   TempSrc: Oral   SpO2: 99%   Weight: 60.1 kg (132 lb 9.6 oz)   Height: 1.67 m (5' 5.75\")     Body mass index is 21.57 kg/m .  Vitals were reviewed and were normal  GENERAL: healthy, alert, well nourished, well hydrated, no distress  MS: extremities- no gross deformities noted, no edema. No warmth over her wrists or her fingers. Slight swelling of her digits.  No OA changes. Knees without effusion or warmth    No results found for any visits on 01/27/20.       Assessment and Plan     Laya was seen today for recheck.    Diagnoses " and all orders for this visit:    Arthralgia of left wrist - reviewed that her symptoms sound to fleeting and that I do not see any changes that would indicate rheum disorder but we will do labs. If are positive, aware might be false positive but seem reasonable for her to then see her daughter's rheumatologist as a personal physician too, for eval.   -     CRP inflammation  -     Erythrocyte Sedimentation Rate (LabDAQ)  -     Cyclic Citrullinated Peptide Antibody IgG  -     Rheumatoid factor  -     Anti Nuclear Snehal IgG by IFA with Reflex  -     Basic Metabolic Panel ([facility])    Anemia, unspecified type - was lower in past. Repeat today  -     Hemoglobin    Diarrhea, unspecified type - unlikely celiac but will test.   -     IgA [LAB73]  -     Deamidated Giladin Peptide Snehal IgA IgG [LPU7734]    Pelvic pain in female - what she has learned makes a lot of sense as to why she has pain where she has. Will work on pelvic PT. Asked about sexual health - she is doing well right now but if increasing sexual activity makes things worse again, will need to think through what to do.         There are no discontinued medications.    Total time: 30 minutes with more than half spent counseling on options for diagnosing her, referral options.           Options for treatment and follow-up care were reviewed with the patient . Laya Sainz  engaged in the decision making process and verbalized understanding of the options discussed and agreed with the final plan.    Barb Lemus MD

## 2020-01-27 ENCOUNTER — OFFICE VISIT (OUTPATIENT)
Dept: FAMILY MEDICINE | Facility: CLINIC | Age: 50
End: 2020-01-27
Payer: COMMERCIAL

## 2020-01-27 VITALS
TEMPERATURE: 97.8 F | HEART RATE: 65 BPM | SYSTOLIC BLOOD PRESSURE: 127 MMHG | HEIGHT: 66 IN | OXYGEN SATURATION: 99 % | WEIGHT: 132.6 LBS | DIASTOLIC BLOOD PRESSURE: 81 MMHG | RESPIRATION RATE: 16 BRPM | BODY MASS INDEX: 21.31 KG/M2

## 2020-01-27 DIAGNOSIS — D64.9 ANEMIA, UNSPECIFIED TYPE: ICD-10-CM

## 2020-01-27 DIAGNOSIS — M25.532 ARTHRALGIA OF LEFT WRIST: Primary | ICD-10-CM

## 2020-01-27 DIAGNOSIS — R10.2 PELVIC PAIN IN FEMALE: ICD-10-CM

## 2020-01-27 DIAGNOSIS — R19.7 DIARRHEA, UNSPECIFIED TYPE: ICD-10-CM

## 2020-01-27 LAB
BUN SERPL-MCNC: 12.7 MG/DL (ref 7–19)
CALCIUM SERPL-MCNC: 9.7 MG/DL (ref 8.5–10.1)
CHLORIDE SERPLBLD-SCNC: 102.6 MMOL/L (ref 98–110)
CO2 SERPL-SCNC: 30.8 MMOL/L (ref 20–32)
CREAT SERPL-MCNC: 0.6 MG/DL (ref 0.5–1)
CRP SERPL-MCNC: <2.9 MG/L (ref 0–8)
ERYTHROCYTE [SEDIMENTATION RATE] IN BLOOD: 3 MM/HR (ref 0–30)
GFR SERPL CREATININE-BSD FRML MDRD: >90 ML/MIN/1.7 M2
GLUCOSE SERPL-MCNC: 94.6 MG'DL (ref 70–99)
HGB BLD-MCNC: 13.6 G/DL (ref 11.7–15.7)
POTASSIUM SERPL-SCNC: 3.6 MMOL/L (ref 3.3–4.5)
SODIUM SERPL-SCNC: 138.9 MMOL/L (ref 132.6–141.4)

## 2020-01-27 RX ORDER — OMEGA-3 FATTY ACIDS/FISH OIL 300-1000MG
200 CAPSULE ORAL EVERY 4 HOURS PRN
COMMUNITY

## 2020-01-27 RX ORDER — CYCLOBENZAPRINE HCL 5 MG
5 TABLET ORAL PRN
COMMUNITY
Start: 2020-01-21 | End: 2020-09-15

## 2020-01-27 ASSESSMENT — MIFFLIN-ST. JEOR: SCORE: 1229.25

## 2020-01-27 NOTE — PATIENT INSTRUCTIONS
Here is the plan from today's visit    1. Arthralgia of left wrist  I will MyChart you the results.  If positive we will send you to the rheumatologist.   - CRP inflammation  - Erythrocyte Sedimentation Rate (LabDAQ)  - Cyclic Citrullinated Peptide Antibody IgG  - Rheumatoid factor  - Anti Nuclear Snehal IgG by IFA with Reflex      Please call or return to clinic if your symptoms don't go away.    Follow up plan  As needed     Thank you for coming to [facility] today.  Lab Testing:  **If you had lab testing today and your results are reassuring or normal they will be mailed to you or sent through [employer] within 7 days.   **If the lab tests need quick action we will call you with the results.  The phone number we will call with results is # [phone] (home) [phone] (work). If this is not the best number please call our clinic and change the number.  Medication Refills:  If you need any refills please call your pharmacy and they will contact us.   If you need to  your refill at a new pharmacy, please contact the new pharmacy directly. The new pharmacy will help you get your medications transferred faster.   Scheduling:  If you have any concerns about today's visit or wish to schedule another appointment please call our office during normal business hours [phone] (8-5:00 M-F)  If a referral was made to a [facility] and you don't get a call from central scheduling please call [phone].  If a Mammogram was ordered for you at [facility] call [phone] to schedule or change your appointment.  If you had an XRay/CT/Ultrasound/MRI ordered the number is [phone] to schedule or change your radiology appointment.   Medical Concerns:  If you have urgent medical concerns please call [phone] at any time of the day.    Barb Lemus MD

## 2020-01-28 LAB
ANA SER QL IF: NEGATIVE
IGA SERPL-MCNC: 112 MG/DL (ref 84–499)
RHEUMATOID FACT SER NEPH-ACNC: <20 IU/ML (ref 0–20)

## 2020-01-29 LAB
CCP AB SER IA-ACNC: 1 U/ML
GLIADIN IGA SER-ACNC: 1 U/ML
GLIADIN IGG SER-ACNC: <1 U/ML

## 2020-02-03 ENCOUNTER — THERAPY VISIT (OUTPATIENT)
Dept: PHYSICAL THERAPY | Facility: CLINIC | Age: 50
End: 2020-02-03
Payer: COMMERCIAL

## 2020-02-03 DIAGNOSIS — R10.2 PELVIC PAIN IN FEMALE: ICD-10-CM

## 2020-02-03 PROCEDURE — 97112 NEUROMUSCULAR REEDUCATION: CPT | Mod: GP | Performed by: PHYSICAL THERAPIST

## 2020-02-03 PROCEDURE — 97110 THERAPEUTIC EXERCISES: CPT | Mod: GP | Performed by: PHYSICAL THERAPIST

## 2020-02-03 PROCEDURE — 97140 MANUAL THERAPY 1/> REGIONS: CPT | Mod: GP | Performed by: PHYSICAL THERAPIST

## 2020-02-24 ENCOUNTER — THERAPY VISIT (OUTPATIENT)
Dept: PHYSICAL THERAPY | Facility: CLINIC | Age: 50
End: 2020-02-24
Payer: COMMERCIAL

## 2020-02-24 DIAGNOSIS — R10.2 PELVIC PAIN IN FEMALE: ICD-10-CM

## 2020-02-24 PROCEDURE — 97140 MANUAL THERAPY 1/> REGIONS: CPT | Mod: GP | Performed by: PHYSICAL THERAPIST

## 2020-02-24 PROCEDURE — 97110 THERAPEUTIC EXERCISES: CPT | Mod: GP | Performed by: PHYSICAL THERAPIST

## 2020-03-06 ENCOUNTER — THERAPY VISIT (OUTPATIENT)
Dept: PHYSICAL THERAPY | Facility: CLINIC | Age: 50
End: 2020-03-06
Payer: COMMERCIAL

## 2020-03-06 DIAGNOSIS — R10.2 PELVIC PAIN IN FEMALE: ICD-10-CM

## 2020-03-06 PROCEDURE — 97140 MANUAL THERAPY 1/> REGIONS: CPT | Mod: GP | Performed by: PHYSICAL THERAPIST

## 2020-04-07 NOTE — PROGRESS NOTES
Subjective:  HPI  Physical Exam                    Objective:  System    Physical Exam    General     ROS    Assessment/Plan:    DISCHARGE REPORT    Progress reporting period is from 1/20/2020 to 3/6/2020.       SUBJECTIVE  Subjective changes noted by patient:  .  Subjective: Was aggravated with attempts of intercourse and stimulation 6 days ago.  Feels irritation around the urethra.  Most consistently it has been in the perineal area.  Sitting back and moving forward with sitting.  Frequency of the pain has ramped up again. First time on the way over in the car felt sharp pain in the area of the coccyx.      Current pain level is  Current Pain level: 5/10.     Previous pain level was   Initial Pain level: 9/10.   Changes in function:  Yes (See Goal flowsheet attached for changes in current functional level)  Adverse reaction to treatment or activity: None    OBJECTIVE  Changes noted in objective findings:  Yes,   Objective: Mild pain pulling at the pubic symphysis with palaption and also over the left levator ani and OI noted with manual work     ASSESSMENT/PLAN  Updated problem list and treatment plan: Diagnosis 1:  Pelvic pain  Pain -  manual therapy, self management, education and home program  Decreased ROM/flexibility - manual therapy, therapeutic exercise and home program  Decreased strength - therapeutic exercise, therapeutic activities and home program  Impaired muscle performance - biofeedback, neuro re-education and home program  Decreased function - therapeutic activities and home program  STG/LTGs have been met or progress has been made towards goals:  Yes (See Goal flow sheet completed today.)  Assessment of Progress: The patient's condition has potential to improve.  Patient canceled visits due to COVID and hoped to be seen in the clinic  Self Management Plans:  Patient has been instructed in a home treatment program.    Laya continues to require the following intervention to meet STG and LTG's:   Patient needs to continue to work on the home exercise program.      Recommendations:  Will discharge patient for now.    Please refer to the daily flowsheet for treatment today, total treatment time and time spent performing 1:1 timed codes.

## 2020-08-27 NOTE — PROGRESS NOTES
MITALI       Laya is a 50 year old  female  who presents for the new concern(s) of:    Chief Complaint   Patient presents with     Nausea     and hot and cold flashes on and off for awhile, worse over the last 3 months        Has done a ton of changes working on her left pelvic pain.  Has stopped gluten and finds she is no longer sneezing.  She does not eat breakfast until 11:30 or so and then often does not eat until dinner time which can be at about 6. She doesn't believe she is getting much protein. Has lost weight but not intentionally although is worried she is not eating enough.    New symptoms:  For the last three months - and notices more since she has been home. One minute is chilled and then hot. Gets goose bumps.  No fever. Changes minute by minute and goes hand in hand it feels nauseated.  Is wondering if she is not eating enough.    Recently she ate a can of sweet potatoes and added butter and sugar and two hours later not right and had some shaking. Comes in waves of 2 - 3 days.     Has had a hysterectomy - supracervical.  For years she has had flushing and this is not flushing. She is also not sweating.  FSH a year ago was in menopausal levels.  ROS: no edema, or hair changes.      Wt Readings from Last 4 Encounters:   08/28/20 53.9 kg (118 lb 12.8 oz)   01/27/20 60.1 kg (132 lb 9.6 oz)   12/02/19 62.2 kg (137 lb 3.2 oz)   11/19/19 59 kg (130 lb)        In the past she thought that her razor blade pain in her urethra was her UTIs, but would have negative UAs and so she is now waiting it out and it resolves on its own. At times she gets right flank pain with this as well.     2 weeks ago she dropped a pot on her third right finger that hurt a lot and felt like everything went dark.     Is getting free blood work through her annual health.  Lipids, lft and renal. Uric acid.    Pain is rattling her and the anxiety alone makes her not want to eat.      Interstitial cystitis - much better since she  "cut out gluten. But not fully gone - still gets LLQ pain that feels like her bladder that comes and goes.     Patient Active Problem List   Diagnosis     Endometriosis     Recurrent UTI     Abnormal Pap smear of cervix     Diverticulosis of large intestine without hemorrhage     Interstitial cystitis     Irritable bowel syndrome with both constipation and diarrhea       Current Outpatient Medications   Medication Sig Dispense Refill     cyclobenzaprine (FLEXERIL) 5 MG tablet Take 5 mg by mouth as needed       ibuprofen (ADVIL/MOTRIN) 200 MG capsule Take 200 mg by mouth every 4 hours as needed for fever       magnesium oxide (MAG-OX) 400 MG tablet Take 1 tablet by mouth daily       cetirizine (ZYRTEC) 10 MG tablet Take 10 mg by mouth daily       diphenhydrAMINE (BENADRYL) 25 MG tablet Take 25 mg by mouth daily       fexofenadine (ALLEGRA) 180 MG tablet Take 180 mg by mouth as needed       loratadine (CLARITIN) 10 MG tablet Take 10 mg by mouth daily       pseudoePHEDrine (SUDAFED) 120 MG 12 hr tablet Take 120 mg by mouth as needed            Allergies   Allergen Reactions     Seasonal Allergies                 Review of Systems:                 Physical Exam:     Vitals:    08/28/20 0927   BP: 108/72   BP Location: Left arm   Patient Position: Sitting   Cuff Size: Adult Regular   Pulse: 62   Resp: 18   Temp: 97.8  F (36.6  C)   TempSrc: Oral   SpO2: 99%   Weight: 53.9 kg (118 lb 12.8 oz)   Height: 1.632 m (5' 4.25\")     Body mass index is 20.23 kg/m .  Vitals were reviewed and were normal  GENERAL: healthy, alert, well nourished, well hydrated, no distress  NECK: no tenderness, no adenopathy, no asymmetry, no masses, no stiffness; thyroid- normal to palpation  RESP: lungs clear to auscultation - no rales, no rhonchi, no wheezes  CV: regular rates and rhythm, normal S1 S2, no S3 or S4 and no murmur, no click or rub -  ABDOMEN: soft, no tenderness, no  hepatosplenomegaly, no masses, normal bowel sounds  MS: extremities- " no gross deformities noted, no edema  LYMPHATICS: ant. cervical- normal, post. cervical- normal, axillary- normal, supraclavicular- normal, inguinal- normal    No results found for any visits on 08/28/20.       Assessment and Plan     Laya was seen today for nausea.    Diagnoses and all orders for this visit:    Chills (without fever) - she is not very worried about this. I agree that likely from her decreased PO. We talked a bit about the importance of eating more but did not get to a real strategy. Also talked about using a diary and reintroducing foods and watching for recurrence as a way to expand what she eats. Suggested considering a Naturopath as well.     H/O abdominal supracervical subtotal hysterectomy - due for pap soon. Plans to get that in December.     Vasovagal syncope - reassured that her response to a painful stimulus is classic. Has low BP to start with.     Weight loss - likely due to her dietary changes. No exam findings to make me worried.     Total time: 25 minutes with more than half spent counseling on dietary changes and ongoing plan around her bladder pain.     Medications Discontinued During This Encounter   Medication Reason     loratadine (CLARITIN) 10 MG tablet          Options for treatment and follow-up care were reviewed with the patient . Laya FELIPE Hand  engaged in the decision making process and verbalized understanding of the options discussed and agreed with the final plan.    Barb Lemus MD

## 2020-08-28 ENCOUNTER — OFFICE VISIT (OUTPATIENT)
Dept: FAMILY MEDICINE | Facility: CLINIC | Age: 50
End: 2020-08-28
Payer: COMMERCIAL

## 2020-08-28 VITALS
OXYGEN SATURATION: 99 % | BODY MASS INDEX: 20.28 KG/M2 | RESPIRATION RATE: 18 BRPM | HEIGHT: 64 IN | TEMPERATURE: 97.8 F | WEIGHT: 118.8 LBS | SYSTOLIC BLOOD PRESSURE: 108 MMHG | DIASTOLIC BLOOD PRESSURE: 72 MMHG | HEART RATE: 62 BPM

## 2020-08-28 DIAGNOSIS — R55 VASOVAGAL SYNCOPE: ICD-10-CM

## 2020-08-28 DIAGNOSIS — Z90.711 H/O ABDOMINAL SUPRACERVICAL SUBTOTAL HYSTERECTOMY: ICD-10-CM

## 2020-08-28 DIAGNOSIS — R63.4 WEIGHT LOSS: ICD-10-CM

## 2020-08-28 DIAGNOSIS — R68.83 CHILLS (WITHOUT FEVER): Primary | ICD-10-CM

## 2020-08-28 ASSESSMENT — MIFFLIN-ST. JEOR: SCORE: 1137.84

## 2020-08-28 NOTE — PATIENT INSTRUCTIONS
Here is the plan from today's visit    1. H/O abdominal supracervical subtotal hysterectomy    2. Vasovagal syncope  No worries     3. Weight loss  We discussed adding back, deliberately and scientifically one food at a time. Could work with a dietitian.   Consider seeing a Naturopath       Please call or return to clinic if your symptoms don't go away.    Follow up plan      Thank you for coming to [facility] today.  Lab Testing:  **If you had lab testing today and your results are reassuring or normal they will be mailed to you or sent through [employer] within 7 days.   **If the lab tests need quick action we will call you with the results.  The phone number we will call with results is # [phone] (home) [phone] (work). If this is not the best number please call our clinic and change the number.  Medication Refills:  If you need any refills please call your pharmacy and they will contact us.   If you need to  your refill at a new pharmacy, please contact the new pharmacy directly. The new pharmacy will help you get your medications transferred faster.   Scheduling:  If you have any concerns about today's visit or wish to schedule another appointment please call our office during normal business hours [phone] (8-5:00 M-F)  If a referral was made to a [facility] and you don't get a call from central scheduling please call [phone].  If a Mammogram was ordered for you at [facility] call [phone] to schedule or change your appointment.  If you had an XRay/CT/Ultrasound/MRI ordered the number is [phone] to schedule or change your radiology appointment.   Medical Concerns:  If you have urgent medical concerns please call [phone] at any time of the day.    Barb Lemus MD

## 2020-09-15 DIAGNOSIS — R10.2 PELVIC PAIN IN FEMALE: Primary | ICD-10-CM

## 2020-09-15 RX ORDER — CYCLOBENZAPRINE HCL 5 MG
5 TABLET ORAL PRN
Qty: 60 TABLET | Refills: 1 | Status: SHIPPED | OUTPATIENT
Start: 2020-09-15 | End: 2022-01-26

## 2020-09-15 NOTE — TELEPHONE ENCOUNTER
"Request for medication refill:  cyclobenzaprine (FLEXERIL) 5 MG tablet     Providers if patient needs an appointment and you are willing to give a one month supply please refill for one month and  send a letter/MyChart using \".SMILLIMITEDREFILL\" .smillimited and route chart to \"P [facility] \" (Giving one month refill in non controlled medications is strongly recommended before denial)    If refill has been denied, meaning absolutely no refills without visit, please complete the smart phrase \".smirxrefuse\" and route it to the \"P [facility] MED REFILLS\"  pool to inform the patient and the pharmacy.    Skinny Verma MA        "

## 2020-09-15 NOTE — TELEPHONE ENCOUNTER
Verify that the refill encounter hasn't been started Yes    [facility] phone call message- patient requesting a refill:    Full Medication Name: cyclobenzaprine (FLEXERIL) 5 MG tablet     Dose: see chart     Pharmacy confirmed as   [id number] - [place], [place] - [facility] AT [facility] & [address] [place] [id number]  Phone: [phone] Fax: [phone]  : Yes    Medication tab checked to see if medication has been sent  Yes    Additional Comments: none     OK to leave a message on voice mail? Yes    Advised patient refill may take up to 2 business days? Yes    Primary language: English      needed? No    Call taken on September 15, 2020 at 11:04 AM by Ariana Kelly to [facility]FILL

## 2020-09-18 ENCOUNTER — NURSE TRIAGE (OUTPATIENT)
Dept: FAMILY MEDICINE | Facility: CLINIC | Age: 50
End: 2020-09-18

## 2020-09-18 NOTE — TELEPHONE ENCOUNTER
"  Additional Information    Negative: Systolic BP < 90 and feeling dizzy, lightheaded, or weak    Negative: Started suddenly after an allergic medicine, an allergic food, or bee sting    Negative: Shock suspected (e.g., cold/pale/clammy skin, too weak to stand, low BP, rapid pulse)    Negative: Difficult to awaken or acting confused  (e.g., disoriented, slurred speech)    Negative: Fainted    Negative: Chest pain    Negative: Bleeding (e.g., vomiting blood, rectal bleeding or tarry stools, severe vaginal bleeding)    Negative: Extra heart beats or heart is beating fast  (i.e., \"palpitations\")    Negative: Sounds like a life-threatening emergency to the triager    Negative: Systolic BP < 80 and NOT dizzy, lightheaded or weak (feels normal)    Negative: Abdominal pain    Negative: Major surgery in the past month    Negative: Fever > 100.5 F (38.1 C)    Negative: Drinking very little and has signs of dehydration (e.g., no urine > 12 hours, very dry mouth, very lightheaded)    Negative: Fall in systolic BP > 20 mm Hg from normal and feeling dizzy, lightheaded, or weak    Negative: Patient sounds very sick or weak to the triager    Negative: Systolic BP < 90 and NOT dizzy, lightheaded or weak    Negative: Systolic BP  while taking blood pressure medications and NOT dizzy, lightheaded or weak    Protocols used: LOW BLOOD PRESSURE-A-OH    Spoke with patient who stated that she was getting bloodwork done for work today and when the nurse took her blood pressure it was 92/56. She stated that she does not want to be seen by a doctor today but wanted to know what symptoms she should watch for which would be \"dangerous.\" She stated that ever since getting her blood drawn she has felt \"funny, like a kind of buzzing sensation.\" She denied fainting, lightheadedness, dizziness, or weakness. She stated that she does have a bit of a headache. Patient does have a history of vasovagal syncope. Advised that she lay down with her " feet elevated for a bit and continue to monitor for symptoms. Advised that she call the clinic before the end of the day if symptoms are worsening or not getting better. Advised that she can always go to urgent care if she feels that she needs to be seen today. She verbalized understanding and had no further questions.  Leelee Cai RN

## 2020-09-18 NOTE — TELEPHONE ENCOUNTER
Pt requesting a c/b from nurse regarding concerns with her blood pressure being very low this morning. 92/56. Please f/u to advise

## 2020-09-18 NOTE — TELEPHONE ENCOUNTER
RN attempted to reach patient to address symptoms. LM to call back. Please transfer to any available RN.   Jacki Sutton, RN

## 2020-12-06 NOTE — PROGRESS NOTES
"Female Physical Note          HPI         Concerns today:     Continues to work very hard on her Insterstitial Cystitis and pelvic floor dysfunction.    Feels like she gets pain in her left hip when she crosses her legs and wondering if she has circulation issues.   Working on this for the last 1 year. Has gotten her pain much better, down from a 10 to a 4 but also notes that she is continuously focused on her pain and it is managing her life. She no longer exercises, nor does she have IC for fear of precipitating it. When she moves, she finds that it hurts. Can still bike - that is the only exercise she can do and does so for 5 miles a day. Unlevel ground and pivoting make all this worse.  She cooks sitting down.   Does PT BID.  Which helps a lot.   Also notes that she had a rectal vault \"papilla\" in the past that was removed. The area where that was removed feels like it is hurting again.  In the past, was very painful to have constipation.     Still feeling the electrical jabs in her arms, and seem to be more intense. Still gets the pelvic pinching/zings that are now in more different places, and once went up the left pelvis up her chest and into her eyes.     Diet - is still afraid of eating something new,for fear of triggering her symptoms. When she sticks to 4 foods, she does better with regard to her pelvic pain.     Had flu vaccine.     ROS:   Still has nipple discharge that she has had for many years if she is stimulating or checks her nipples.     Depression:  Is not as depressed because she  Is better but her chronic illness makes it hard for her not to be down.     Last visit she was nauseated and had dry mouth, is now better with less Flexeril.  When they wore off, she would have harder time sleeping. She has gained some of the weight back since then    Meds:  Averages about 2 ibuprofen - 200mg each.   At most will go three days without ibuprofen.   Has not tried tylenol for awhile.   Does not want " routine meds.  AAsking about Gabapentin as a prn.     Has tried Prozac and Paxil in the past where she got freaked out by some lapses in theinking. This was early 20s.     Weight change  Wt Readings from Last 4 Encounters:   12/07/20 54.3 kg (119 lb 12.8 oz)   08/28/20 53.9 kg (118 lb 12.8 oz)   01/27/20 60.1 kg (132 lb 9.6 oz)   12/02/19 62.2 kg (137 lb 3.2 oz)       Patient Active Problem List   Diagnosis     Endometriosis     Recurrent UTI     Abnormal Pap smear of cervix     Diverticulosis of large intestine without hemorrhage     Interstitial cystitis     Irritable bowel syndrome with both constipation and diarrhea     H/O abdominal supracervical subtotal hysterectomy     Vasovagal syncope       No past medical history on file.    Previous Medical Care   Endometriosis for which she had full hysterectomy except for cervix.      Family History   Problem Relation Age of Onset     Thyroid Disease Mother      Asthma Father      Thyroid Disease Daughter      Sjogren's Daughter      Lupus Daughter      Diabetes No family hx of      Coronary Artery Disease No family hx of      Hypertension No family hx of      Hyperlipidemia No family hx of      Cerebrovascular Disease No family hx of      Breast Cancer No family hx of      Prostate Cancer No family hx of      Colon Cancer No family hx of      Other Cancer No family hx of      Depression No family hx of      Anxiety Disorder No family hx of      Mental Illness No family hx of      Substance Abuse No family hx of      Anesthesia Reaction No family hx of      Osteoporosis No family hx of      Genetic Disorder No family hx of      Obesity No family hx of      Unknown/Adopted No family hx of          Pedigree Assessment Tool    A score of ?8 is the optimum referral threshold.  GM sister had breast cancer  Risk Factor    Score (for every family member with breast or ovarian cancer, including 2nd/3rd degree relatives)  Breast cancer at age ?50 y  3  Breast cancer at age <50  y  4  Ovarian cancer at any age  5  Male breast cancer at any age 8  Ashkenazi Jain heritage  4         Review of Systems:     Review of Systems:  CONSTITUTIONAL: NEGATIVE for fever, chills, change in weight  INTEGUMENTARY/SKIN: NEGATIVE for worrisome rashes, moles or lesions  EYES: NEGATIVE for vision changes or irritation  ENT/MOUTH: NEGATIVE for ear, mouth and throat problems  RESP: NEGATIVE for significant cough or SOB  BREAST: NEGATIVE for masses, tenderness or discharge  CV: NEGATIVE for chest pain, palpitations or peripheral edema  GI: NEGATIVE for nausea, abdominal pain, heartburn, or change in bowel habits  : see above  MUSCULOSKELETAL: NEGATIVE for significant arthralgias or myalgia  NEURO: NEGATIVE for weakness, dizziness or paresthesias  ENDOCRINE: NEGATIVE for temperature intolerance, skin/hair changes  HEME/ALLERGY: NEGATIVE for bleeding problems  PSYCHIATRIC: NEGATIVE for changes in mood or affect  Sleep:   Do you snore most or the night (as reported by a family member)? No  Do you feel sleepy or extremely tired during most of the day? No             Social History     Social History     Socioeconomic History     Marital status:      Spouse name: Not on file     Number of children: Not on file     Years of education: Not on file     Highest education level: Not on file   Occupational History     Not on file   Social Needs     Financial resource strain: Not on file     Food insecurity     Worry: Not on file     Inability: Not on file     Transportation needs     Medical: Not on file     Non-medical: Not on file   Tobacco Use     Smoking status: Former Smoker     Packs/day: 0.00     Quit date: 10/4/2004     Years since quittin.1     Smokeless tobacco: Never Used   Substance and Sexual Activity     Alcohol use: Yes     Comment: 2 drinks once a week     Drug use: No     Sexual activity: Yes     Partners: Male     Birth control/protection: None   Lifestyle     Physical activity     Days per  "week: Not on file     Minutes per session: Not on file     Stress: Not on file   Relationships     Social connections     Talks on phone: Not on file     Gets together: Not on file     Attends Scientologist service: Not on file     Active member of club or organization: Not on file     Attends meetings of clubs or organizations: Not on file     Relationship status: Not on file     Intimate partner violence     Fear of current or ex partner: Not on file     Emotionally abused: Not on file     Physically abused: Not on file     Forced sexual activity: Not on file   Other Topics Concern     Parent/sibling w/ CABG, MI or angioplasty before 65F 55M? Not Asked   Social History Narrative     Not on file       Marital Status:  Who lives in your household? With her     Has anyone hurt you physically, for example by pushing, hitting, slapping or kicking you or forcing you to have sex? Denies  Do you feel threatened or controlled by a partner, ex-partner or anyone in your life? Denies    Sexual Health     Sexual concerns: Yes   Pregnancy History:   LMP No LMP recorded (lmp unknown). Patient has had a hysterectomy.   Last Pap Smear Date:   Lab Results   Component Value Date    PAP ASC-US 2017    PAP ASC-H 2015           Recommended Screening     colon >50 (A)   HIV (A)  Colon CA Screening (>50-75 ):  Testing not indicated  and Breast CA Screening (>39 yo or 10 y before 1st degree relative diagnosis): Recommended and patient accepted testing.  ASA: 50-59 if >=10% CVD risk (B), 60 - 69 if >=10% CVD risk (C)           Physical Exam:     Vitals: /74   Pulse 58   Resp 16   Ht 1.676 m (5' 6\")   Wt 54.3 kg (119 lb 12.8 oz)   LMP  (LMP Unknown)   SpO2 100%   BMI 19.34 kg/m    BMI= Body mass index is 19.34 kg/m .   GENERAL: healthy, alert and no distress  EYES: Eyes grossly normal to inspection, extraocular movements - intact, and PERRL  HENT: ear canals- normal; TMs- normal; Nose- normal; " Mouth- no ulcers, no lesions  NECK: no tenderness, no adenopathy, no asymmetry, no masses, no stiffness; thyroid- normal to palpation  RESP: lungs clear to auscultation - no rales, no rhonchi, no wheezes  BREAST: no masses, no tenderness, no nipple discharge, no palpable axillary masses or adenopathy. Nipples without discharge with routine exam  CV: regular rates and rhythm, normal S1 S2, no S3 or S4 and no murmur, no click or rub -  ABDOMEN: soft, no tenderness, no  hepatosplenomegaly, no masses, normal bowel sounds  MS: extremities- no gross deformities noted, no edema  SKIN: no suspicious lesions, no rashes  - female: vulva normal without signs of atrophy. Difficult for me to do the speculum exam, felt a lot of pressure on her bladder with opening of the speculum and so difficult to see the cervix.  Blind pap done.   RECTAL- female: no masses, no hemorrhoids  PSYCH: Alert and oriented times 3; speech- coherent , normal rate and volume; able to articulate logical thoughts, able to abstract reason, no tangential thoughts, no hallucinations or delusions, affect- anxious  LYMPHATICS: ant. cervical- normal, post. cervical- normal, axillary- normal, supraclavicular- normal, inguinal- normal      Assessment and Plan      Laya was seen today for physical.    Diagnoses and all orders for this visit:    Encounter for preventive care - doing better with her eating and her prior symptoms.  Difficult to do the pap smear due to extreme pain with pelvic exam - more of a blind pap. She had abnormal pap in past with negative HPV and negative colpos.  Plans to have her mammogram in a few months. Lipids since she has eggs every day - one of the few proteins she can eat that don't precipitate symptoms.   -     Hepatitis C antibody  -     Pap imaged thin layer screen with HPV - recommended age 30 - 65 years (select HPV order below)  -     HPV High Risk Types DNA Cervical  -     HIV Antigen Antibody Combo  -     Screening  Mammogram Digital Bilateral; Future  -     Basic metabolic panel  -     Lipid panel reflex to direct LDL Non-fasting    Pelvic pain in female - 2015 visit notes that she had papilla on colonoscopy that was removed but no notes to verify this.  Last colonoscopy since then showed no rectal abnormality. My rectal was without any masses.  Reviewed that I think she should be on daily Gabapentin to both decrease side effects and also to consistently treat any nerve pain and thus bring her chronic pain down further.  The Gabapentin might help with anxiety around her pain as well.  To consider Effexor or another SNRI in the future but at this point she is hoping to avoid meds that affect her thinking.    -     gabapentin (NEURONTIN) 100 MG capsule; Take one at bedtime and up to 2 a day as needed.  -     Cancel: Basic Metabolic Panel ([facility])  -     Cancel: Lipid [place] ([facility])          Options for treatment and follow-up care were reviewed with the patient . Laya FELIPE Hand and/or guardian engaged in the decision making process and verbalized understanding of the options discussed and agreed with the final plan.    Barb Lemus MD

## 2020-12-07 ENCOUNTER — OFFICE VISIT (OUTPATIENT)
Dept: FAMILY MEDICINE | Facility: CLINIC | Age: 50
End: 2020-12-07
Payer: COMMERCIAL

## 2020-12-07 VITALS
RESPIRATION RATE: 16 BRPM | HEIGHT: 66 IN | DIASTOLIC BLOOD PRESSURE: 74 MMHG | SYSTOLIC BLOOD PRESSURE: 112 MMHG | OXYGEN SATURATION: 100 % | WEIGHT: 119.8 LBS | BODY MASS INDEX: 19.25 KG/M2 | HEART RATE: 58 BPM

## 2020-12-07 DIAGNOSIS — R10.2 PELVIC PAIN IN FEMALE: ICD-10-CM

## 2020-12-07 DIAGNOSIS — Z00.00 ENCOUNTER FOR PREVENTIVE CARE: Primary | ICD-10-CM

## 2020-12-07 LAB
ANION GAP SERPL CALCULATED.3IONS-SCNC: 5 MMOL/L (ref 3–14)
BUN SERPL-MCNC: 14 MG/DL (ref 7–30)
CALCIUM SERPL-MCNC: 9.1 MG/DL (ref 8.5–10.1)
CHLORIDE SERPL-SCNC: 108 MMOL/L (ref 94–109)
CHOLEST SERPL-MCNC: 189 MG/DL
CO2 SERPL-SCNC: 27 MMOL/L (ref 20–32)
CREAT SERPL-MCNC: 0.58 MG/DL (ref 0.52–1.04)
GFR SERPL CREATININE-BSD FRML MDRD: >90 ML/MIN/{1.73_M2}
GLUCOSE SERPL-MCNC: 82 MG/DL (ref 70–99)
HDLC SERPL-MCNC: 82 MG/DL
LDLC SERPL CALC-MCNC: 95 MG/DL
NONHDLC SERPL-MCNC: 107 MG/DL
POTASSIUM SERPL-SCNC: 3.9 MMOL/L (ref 3.4–5.3)
SODIUM SERPL-SCNC: 139 MMOL/L (ref 133–144)
TRIGL SERPL-MCNC: 58 MG/DL

## 2020-12-07 PROCEDURE — 80061 LIPID PANEL: CPT | Performed by: FAMILY MEDICINE

## 2020-12-07 PROCEDURE — 86803 HEPATITIS C AB TEST: CPT | Performed by: FAMILY MEDICINE

## 2020-12-07 PROCEDURE — G0145 SCR C/V CYTO,THINLAYER,RESCR: HCPCS | Performed by: FAMILY MEDICINE

## 2020-12-07 PROCEDURE — 87624 HPV HI-RISK TYP POOLED RSLT: CPT | Performed by: FAMILY MEDICINE

## 2020-12-07 PROCEDURE — 87389 HIV-1 AG W/HIV-1&-2 AB AG IA: CPT | Performed by: FAMILY MEDICINE

## 2020-12-07 PROCEDURE — 36415 COLL VENOUS BLD VENIPUNCTURE: CPT | Performed by: FAMILY MEDICINE

## 2020-12-07 PROCEDURE — 99396 PREV VISIT EST AGE 40-64: CPT | Performed by: FAMILY MEDICINE

## 2020-12-07 PROCEDURE — 80048 BASIC METABOLIC PNL TOTAL CA: CPT | Performed by: FAMILY MEDICINE

## 2020-12-07 PROCEDURE — 99213 OFFICE O/P EST LOW 20 MIN: CPT | Mod: 25 | Performed by: FAMILY MEDICINE

## 2020-12-07 RX ORDER — GABAPENTIN 100 MG/1
CAPSULE ORAL
Qty: 90 CAPSULE | Refills: 1 | Status: SHIPPED | OUTPATIENT
Start: 2020-12-07 | End: 2021-06-01

## 2020-12-07 ASSESSMENT — MIFFLIN-ST. JEOR: SCORE: 1170.16

## 2020-12-07 NOTE — PATIENT INSTRUCTIONS
Here is the plan from today's visit    1. Encounter for preventive care  - Hepatitis C antibody  - Pap imaged thin layer screen with HPV - recommended age 30 - 65 years (select HPV order below)  - HPV High Risk Types DNA Cervical  - HIV Antigen Antibody Combo  - Screening Mammogram Digital Bilateral; Future    2. Pelvic pain in female  Try if possible to take the gabapentin nightly for at least 1 month before saying not helpful., If you tolerate it and notice a slight improvement, we can increase the dose and possibly get more effect.    Consider treating with an antidepressant to help you not focus so much on the pain  Try to take Tylenol.   - gabapentin (NEURONTIN) 100 MG capsule; Take one at bedtime and up to 2 a day as needed.  Dispense: 90 capsule; Refill: 1      3. I will review your anal papilla history and get back to you if it makes sense to do anything else.           Please call or return to clinic if your symptoms don't go away.    Follow up plan  As needed or in a year    Thank you for coming to [facility] today.  Lab Testing:  **If you had lab testing today and your results are reassuring or normal they will be mailed to you or sent through [employer] within 7 days.   **If the lab tests need quick action we will call you with the results.  The phone number we will call with results is # [phone] (home) [phone] (work). If this is not the best number please call our clinic and change the number.  Medication Refills:  If you need any refills please call your pharmacy and they will contact us.   If you need to  your refill at a new pharmacy, please contact the new pharmacy directly. The new pharmacy will help you get your medications transferred faster.   Scheduling:  If you have any concerns about today's visit or wish to schedule another appointment please call our office during normal business hours [phone] (8-5:00 M-F)  If a referral was made to a [facility]  Physicians and you don't get a call from central scheduling please call [phone].  If a Mammogram was ordered for you at [facility] call [phone] to schedule or change your appointment.  If you had an XRay/CT/Ultrasound/MRI ordered the number is [phone] to schedule or change your radiology appointment.   Medical Concerns:  If you have urgent medical concerns please call [phone] at any time of the day.    Barb Lemus MD

## 2020-12-08 LAB
HCV AB SERPL QL IA: NONREACTIVE
HIV 1+2 AB+HIV1 P24 AG SERPL QL IA: NONREACTIVE

## 2020-12-08 ASSESSMENT — ANXIETY QUESTIONNAIRES
IF YOU CHECKED OFF ANY PROBLEMS ON THIS QUESTIONNAIRE, HOW DIFFICULT HAVE THESE PROBLEMS MADE IT FOR YOU TO DO YOUR WORK, TAKE CARE OF THINGS AT HOME, OR GET ALONG WITH OTHER PEOPLE: SOMEWHAT DIFFICULT
2. NOT BEING ABLE TO STOP OR CONTROL WORRYING: SEVERAL DAYS
3. WORRYING TOO MUCH ABOUT DIFFERENT THINGS: SEVERAL DAYS
5. BEING SO RESTLESS THAT IT IS HARD TO SIT STILL: NOT AT ALL
1. FEELING NERVOUS, ANXIOUS, OR ON EDGE: SEVERAL DAYS
GAD7 TOTAL SCORE: 5
7. FEELING AFRAID AS IF SOMETHING AWFUL MIGHT HAPPEN: SEVERAL DAYS
6. BECOMING EASILY ANNOYED OR IRRITABLE: NOT AT ALL

## 2020-12-08 ASSESSMENT — PATIENT HEALTH QUESTIONNAIRE - PHQ9
SUM OF ALL RESPONSES TO PHQ QUESTIONS 1-9: 6
5. POOR APPETITE OR OVEREATING: SEVERAL DAYS

## 2020-12-09 LAB
COPATH REPORT: NORMAL
PAP: NORMAL

## 2020-12-09 ASSESSMENT — ANXIETY QUESTIONNAIRES: GAD7 TOTAL SCORE: 5

## 2020-12-11 LAB
FINAL DIAGNOSIS: NORMAL
HPV HR 12 DNA CVX QL NAA+PROBE: NEGATIVE
HPV16 DNA SPEC QL NAA+PROBE: NEGATIVE
HPV18 DNA SPEC QL NAA+PROBE: NEGATIVE
SPECIMEN DESCRIPTION: NORMAL
SPECIMEN SOURCE CVX/VAG CYTO: NORMAL

## 2021-01-15 ENCOUNTER — HEALTH MAINTENANCE LETTER (OUTPATIENT)
Age: 51
End: 2021-01-15

## 2021-03-14 ENCOUNTER — HEALTH MAINTENANCE LETTER (OUTPATIENT)
Age: 51
End: 2021-03-14

## 2021-06-01 DIAGNOSIS — R10.2 PELVIC PAIN IN FEMALE: ICD-10-CM

## 2021-06-01 RX ORDER — GABAPENTIN 100 MG/1
CAPSULE ORAL
Qty: 90 CAPSULE | Refills: 1 | Status: SHIPPED | OUTPATIENT
Start: 2021-06-01 | End: 2021-12-21

## 2021-06-01 NOTE — TELEPHONE ENCOUNTER
Verify that the refill encounter hasn't been started Yes    [facility] Medicine phone call message- patient requesting a refill:    Full Medication Name: gabapentin (NEURONTIN) 100 MG capsule    Dose: see chart     Pharmacy confirmed as   [id number] - [place] [address] AT [address] [address]  Phone: [phone] Fax: [phone]  : Yes    Medication tab checked to see if medication has been sent  Yes    Additional Comments: none     OK to leave a message on voice mail? Yes    Advised patient refill may take up to 2 business days? Yes    Primary language: English      needed? No    Call taken on June 1, 2021 at 9:44 AM by Ariana Kelly to  [facility]

## 2021-08-25 ENCOUNTER — NURSE TRIAGE (OUTPATIENT)
Dept: NURSING | Facility: CLINIC | Age: 51
End: 2021-08-25

## 2021-08-26 NOTE — TELEPHONE ENCOUNTER
"Patient ate dinner at 5:30 pm and since 6 pm she has had abdominal pain. Patient states \"it feels like she was punched in the stomach\". Last night she had pain over her right ovary which today is tender to touch today. Pain is mainly in the upper stomach and radiating from upper stomach all over her abdomin. Pain rating 9/10 and constant since 6 pm.  Protocol recommends ED now.   Care advice given.   Juanita Mcgrath RN   08/25/21 8:21 PM  [facility] Nurse Advisor  COVID 19 Nurse Triage Plan/Patient Instructions    Please be aware that novel coronavirus (COVID-19) may be circulating in the community. If you develop symptoms such as fever, cough, or SOB or if you have concerns about the presence of another infection including coronavirus (COVID-19), please contact your health care provider or visit [email or web address].     Disposition/Instructions    ED Visit recommended. Follow protocol based instructions.     Bring Your Own Device:  Please also bring your smart device(s) (smart phones, tablets, laptops) and their charging cables for your personal use and to communicate with your care team during your visit.    Thank you for taking steps to prevent the spread of this virus.  o Limit your contact with others.  o Wear a simple mask to cover your cough.  o Wash your hands well and often.    Resources    [facility]: About COVID-19: [email or web address]    CDC: What to Do If You're Sick: www.cdc.gov/coronavirus/2019-ncov/about/steps-when-sick.html    CDC: Ending Home Isolation: www.cdc.gov/coronavirus/2019-ncov/hcp/disposition-in-home-patients.html     CDC: Caring for Someone: www.cdc.gov/coronavirus/2019-ncov/if-you-are-sick/care-for-someone.html     [facility]: Interim Guidance for Hospital Discharge to Home: www.health.[facility].[place]/diseases/coronavirus/hcp/hospdischarge.pdf    [facility] clinical trials (COVID-19 research studies): [email or web address] " "    Below are the [employer]-19 hotlines at the [place] Department of Health ([facility]). Interpreters are available.   o For health questions: Call 156-739-1992 or 1-535.570.4623 (7 a.m. to 7 p.m.)  o For questions about schools and childcare: Call 476-908-2204 or 1-815.197.8948 (7 a.m. to 7 p.m.)   Reason for Disposition    Pain is mainly in upper abdomen  (if needed ask: \"is it mainly above the belly button?\")    [1] SEVERE pain (e.g., excruciating) AND [2] present > 1 hour    Additional Information    Negative: Shock suspected (e.g., cold/pale/clammy skin, too weak to stand, low BP, rapid pulse)    Negative: Difficult to awaken or acting confused (e.g., disoriented, slurred speech)    Negative: Passed out (i.e., lost consciousness, collapsed and was not responding)    Negative: Sounds like a life-threatening emergency to the triager    Negative: Chest pain    Negative: Severe difficulty breathing (e.g., struggling for each breath, speaks in single words)    Negative: Shock suspected (e.g., cold/pale/clammy skin, too weak to stand, low BP, rapid pulse)    Negative: Difficult to awaken or acting confused (e.g., disoriented, slurred speech)    Negative: Passed out (i.e., lost consciousness, collapsed and was not responding)    Negative: Visible sweat on face or sweat dripping down face    Negative: Sounds like a life-threatening emergency to the triager    Negative: Followed an abdomen (stomach) injury    Negative: Chest pain    Protocols used: ABDOMINAL PAIN - FEMALE-A-AH, ABDOMINAL PAIN - UPPER-A-AH      "

## 2021-09-03 ENCOUNTER — OFFICE VISIT (OUTPATIENT)
Dept: FAMILY MEDICINE | Facility: CLINIC | Age: 51
End: 2021-09-03
Payer: COMMERCIAL

## 2021-09-03 VITALS
HEIGHT: 65 IN | WEIGHT: 115.2 LBS | SYSTOLIC BLOOD PRESSURE: 107 MMHG | OXYGEN SATURATION: 100 % | TEMPERATURE: 98 F | HEART RATE: 62 BPM | BODY MASS INDEX: 19.19 KG/M2 | DIASTOLIC BLOOD PRESSURE: 66 MMHG

## 2021-09-03 DIAGNOSIS — N20.0 KIDNEY STONE: Primary | ICD-10-CM

## 2021-09-03 LAB
CREAT UR-MCNC: 76 MG/DL
URATE 24H UR-MRATE: 0.37 G/G CR
URATE UR-MCNC: 28.1 MG/DL

## 2021-09-03 PROCEDURE — 99214 OFFICE O/P EST MOD 30 MIN: CPT | Performed by: FAMILY MEDICINE

## 2021-09-03 PROCEDURE — 84560 ASSAY OF URINE/URIC ACID: CPT | Performed by: FAMILY MEDICINE

## 2021-09-03 ASSESSMENT — MIFFLIN-ST. JEOR: SCORE: 1120.48

## 2021-09-03 NOTE — PATIENT INSTRUCTIONS
Patient Education   Here is the plan from today's visit    1. Kidney stone  I have referred you to the stone clinic - the phone number should be in your AVS.   They will hopefully be able to use the urine tests you are doing.   Work on drinking enough liquids  - Oxalate quantitative urine; Future  - Uric acid random urine with Creat Ratio; Future  - Uric acid random urine with Creat Ratio  - Calcium timed urine with Creat Ratio; Future  - Adult Nephrology Referral; Future    2. Pelvic Pain  I am so impressed by your ability to decrease your pain again.  Excellent work!    3. Lung nodule  Reminder to repeat your CT scan in 1 year      Please call or return to clinic if your symptoms don't go away.    Follow up plan  No follow-ups on file.    Thank you for coming to [facility] today.  COVID-19 Vaccine:  [facility] has walk-in appointments for COVID-19 vaccines. No appointment needed!   You also have the option of receiving Moderna vaccine during your physician appointment. Please ask your care team for more information!  Lab Testing:  **If you had lab testing today and your results are reassuring or normal they will be mailed to you or sent through [facility] within 7 days.   **If the lab tests need quick action we will call you with the results.  **If you are having labs done on a different day, please call [phone] to schedule at [facility] or [phone] for other [facility] locations.   The phone number we will call with results is # [phone] (home) [phone] (work). If this is not the best number please call our clinic and change the number.  Medication Refills:  If you need any refills please call your pharmacy and they will contact us.   If you need to  your refill at a new pharmacy, please contact the new pharmacy directly. The new pharmacy will help you get your medications transferred faster.   Scheduling:  If you have any concerns about today's visit or  wish to schedule another appointment please call our office during normal business hours [phone] (8-5:00 M-F)  If a referral was made to a [facility] and you don't get a call from central scheduling please call [phone].  If a Mammogram was ordered for you at [facility] call [phone] to schedule or change your appointment.  If you had an EKG/XRay/CT/Ultrasound/MRI ordered the number is [phone] to schedule or change your radiology appointment.   Medical Concerns:  If you have urgent medical concerns please call [phone] at any time of the day.    Barb Lemus MD

## 2021-09-03 NOTE — PROGRESS NOTES
Assessment & Plan     Kidney stone  Reassured her that I don't think the pelvic pain she has had for years now is from the stone in her renal pelvis. She is interested in knowing what to avoid dietary wise and so will refer her to the stone clinic for guidance. She is aware needs to drink more liquids on and ongoing basis.   - Oxalate quantitative urine; Future  - Uric acid random urine with Creat Ratio; Future  - Uric acid random urine with Creat Ratio  - Calcium timed urine with Creat Ratio; Future  - Adult Nephrology Referral; Future    Chronic pelvic pain  Impressed that she has been able to manage her pain back down again despite having had appendicitis.  Predicted that she will be able to get it back under control. To continue with her PT and the gabapentin.     Lung nodule  Reviewed that very likely not concerning but need to repeat the scan in 1 year.  Ok with that.       36 minutes spent on the date of the encounter doing chart review, history and exam, documentation and further activities per the note           No follow-ups on file.    Barb Lemus MD  [facility] DEVORAH Asif is a 51 year old who presents for the following health issues     HPI     Was recently admitted for acute appendicitis. Went well. Recovering well from the surgery.   What has been more difficult is her Interstitiial pain. Prior to the surgery, she had been doing really well by doing PT every day. Her pain was still there but more of a level 1.      Then 2 days after surgery it came back. Same as had been in the past. Was at a level 7 in the hospital and daily. Is now down to level 4, daily, so is getting a bit better. Pain is on the left always.  Went notices it stabbing in her left pelvic area, she notes left posterior pelvic swelling.   Told by the surgeon that she did not have internal scar tissue.   Also noted return of the nausea with the pain, which is typical for her. Pain more when  "standing and reaching, and pivoting  Was slowly decreasing the gabapentin to 1 every 2 days but then has gone up on those post surgery. Needing a refill on the muscle relaxer too in the next month.       Found out that she has a kidney stone - is worried about what to do diet wise. Works so hard on her Insterstitial diet and now is trying to follow a kidney stone diet which is stressful for her.  Worried she is going to spiral out of control.   Has been wondering if this is stone pain.     Aware of her pulmonary nodule but less concerned about that          Review of Systems         Objective    /66   Pulse 62   Temp 98  F (36.7  C) (Oral)   Ht 1.638 m (5' 4.5\")   Wt 52.3 kg (115 lb 3.2 oz)   LMP  (LMP Unknown)   SpO2 100%   BMI 19.47 kg/m    Body mass index is 19.47 kg/m .  Physical Exam                   "

## 2021-09-07 ENCOUNTER — LAB (OUTPATIENT)
Dept: LAB | Facility: CLINIC | Age: 51
End: 2021-09-07
Payer: COMMERCIAL

## 2021-09-07 DIAGNOSIS — N20.0 KIDNEY STONE: ICD-10-CM

## 2021-09-07 LAB
CALCIUM 24H UR-MRATE: 0.23 G/SPEC
CALCIUM UR-MCNC: 15.1 MG/DL
CALCIUM/CREAT UR: 0.23 G/G CR
COLLECT DURATION TIME UR: 24 H
CREAT 24H UR-MRATE: 1 G/SPEC (ref 0.8–1.8)
CREAT UR-MCNC: 66 MG/DL
SPECIMEN VOL UR: 1510 ML

## 2021-09-07 PROCEDURE — 82340 ASSAY OF CALCIUM IN URINE: CPT

## 2021-09-07 PROCEDURE — 81050 URINALYSIS VOLUME MEASURE: CPT

## 2021-09-07 PROCEDURE — 83945 ASSAY OF OXALATE: CPT | Mod: 90

## 2021-09-07 PROCEDURE — 99000 SPECIMEN HANDLING OFFICE-LAB: CPT

## 2021-09-08 LAB
Lab: NORMAL
PERFORMING LABORATORY: NORMAL
SPECIMEN STATUS: NORMAL
TEST NAME: NORMAL

## 2021-09-13 LAB — MISCELLANEOUS TEST 1 (ARUP): NORMAL

## 2021-10-22 ENCOUNTER — TELEPHONE (OUTPATIENT)
Dept: FAMILY MEDICINE | Facility: CLINIC | Age: 51
End: 2021-10-22
Payer: COMMERCIAL

## 2021-10-22 DIAGNOSIS — N20.0 KIDNEY STONE: Primary | ICD-10-CM

## 2021-10-22 NOTE — TELEPHONE ENCOUNTER
"RN spoke with patient- was referred to Nephrology Stone clinic back on 9/3/21 by Dr. Lemus. States she was never contacted about this. RN had CC look into referral and it appears it was \"closed\"- unsure what happened but patient will need new referral.     RN apologized to patient, advised that I would send message high priority to PCP to re-order referral and then include CC to monitor it for her and assist with scheduling if needed. Patient agreeable to plan- states she isn't having any pain or symptoms right now but wanted to follow through with seeing the specialist to hopefully get some clarification of her pain. RN also gave patient the Nephrology clinic phone number of [phone] but advised if she called today they would likely tell her she needed a new referral.   Routing to PCP and CC.   Jacki Sutton RN    "

## 2021-10-22 NOTE — TELEPHONE ENCOUNTER
[facility] phone call message- patient requesting results:    Test: Urine   Date of test: 9/7/21    Additional Comments: Patient called requesting lab results. Patient received a message from  and was told that the kidney department would follow up with her and she still hasn't heard from anyone about any results.     OK to leave a message on voice mail? Yes    Primary language: English      needed? No    Call taken on October 22, 2021 at 3:44 PM by Cecilia Arnold

## 2021-10-24 ENCOUNTER — HEALTH MAINTENANCE LETTER (OUTPATIENT)
Age: 51
End: 2021-10-24

## 2021-10-25 NOTE — TELEPHONE ENCOUNTER
10/25/21  placed new referral in patient chart. Referral is currently in review with Central Scheduling, they will contact patient directly to schedule.    Farzana Lange  Care Coordinator

## 2021-11-09 NOTE — TELEPHONE ENCOUNTER
Patient calling clinic to request a call back from Dr. Lemus, RN, or CC. Per patient, she still hasnt received her results or was called about the results. Patient was contacted to schedule an appointment per referral but the next opening is not until May 2022. Patient is asking for someone to review the results and explain what they are to her and assist with getting her in sooner for the kidney management appointment.

## 2021-11-09 NOTE — TELEPHONE ENCOUNTER
Routing to PCP to review results from studies on 9/7/21 and interpret for patient and discuss if there is another option for her to see specialist sooner than May of next year.   Jacki Sutton RN

## 2021-11-12 ENCOUNTER — TELEPHONE (OUTPATIENT)
Dept: MULTI SPECIALTY CLINIC | Facility: CLINIC | Age: 51
End: 2021-11-12
Payer: COMMERCIAL

## 2021-11-12 ENCOUNTER — MEDICAL CORRESPONDENCE (OUTPATIENT)
Dept: HEALTH INFORMATION MANAGEMENT | Facility: CLINIC | Age: 51
End: 2021-11-12
Payer: COMMERCIAL

## 2021-11-12 NOTE — TELEPHONE ENCOUNTER
----- Message from Mona Velasquez MD sent at 11/11/2021 11:47 AM CST -----  Hi Aviva!   That is a good start for assessing what can be done for stone prevention.   Urine oxalate level is normal so that is good news.  Urine calcium level is high - 230 mg/24 hours - especially given her weight of around 115 lbs - this can be related to sodium and animal flesh intake. Calcium pills and Vitamin D excess can also raise urine calcium. I don't see those supplements on her medication list but you might double check whether she is taking calcium or Vitamin D supplement.  Urine volume is below goal of >2.5 liters per day.  Urine uric acid level is low/normal.  Urine pH in 2019 was high (7) raising risk of calcium phosphate stones.    I cced my nurse, Kaela Calvillo, who can order a litholink (24 hour urine collection for stone risk factors) under my name. When that is completed I can try to add Laya on to clinic.    In the meantime, you can give this basic advice:    Basic advice to avoid kidney stones  - Drink 100 ounces of fluid daily (urine volume should be 80 ounces per day)  - low sodium diet (less than 2400 mg sodium per day- 500-700 mg per meal)  - minimize processed foods  - three servings daily of food/beverage high in calcium.  Please have one high calcium food three times a day with meals - can be either 8 oz milk, 6 oz yogurt or 2 oz low sodium cheese or 8 oz calcium fortified OJ/dairy alternative)   - unsweetened flax milk is the preferred plant based milk (avoid almond milk)  ---- Total should be at least 1200 mg calcium a day from food  - Protein (meat) in moderation    Higher fruit and vegetable intake preferred  Some good options include: (higher alkali fruits and vegetables)  -apples, apricots, oranges (the pith contains oxalate so be mindful of portions), peaches, pears, raisins, strawberries, carrots, cauliflower, eggplant, lettuce, potatoes, tomatoes, and zucchini    Hope this helps!    Mona    -----  Message -----  From: Barb Lemus MD  Sent: 11/11/2021  11:07 AM CST  To: MD Cullen Cano  I tried to put in an e-consult but there doesn't look like there is an option for stone mgmt.   This patient has a high level of anxiety and recently found to have an incidental kidney stone when presenting with appendicitis.     2. Nonobstructing calculi in the lower pole of the left kidney measuring up to 0.4 cm.       We don't have a stone to analyse. She is very focused on her diet with regard to optimal health and asked me what to do. I did a super quick [employer] search in the moment and ordered some  stone labs (that are likely not the right ones).  I cannot interpret them for her and had referred her to your clinic. There are no appointments til March , I think.    Thoughts? Can you help interpret her results and/or suggest better labs and/or give me advice on how to get her seen earlier?  Can she be seen by dietary?    Thanks!Aviva

## 2021-11-12 NOTE — TELEPHONE ENCOUNTER
PLUQ order form filled out and faxed to [employer] at [phone].    Kaela Calvillo LPN  11/12/21  10:06 AM

## 2021-12-21 ENCOUNTER — MYC MEDICAL ADVICE (OUTPATIENT)
Dept: FAMILY MEDICINE | Facility: CLINIC | Age: 51
End: 2021-12-21
Payer: COMMERCIAL

## 2021-12-21 DIAGNOSIS — R10.2 PELVIC PAIN IN FEMALE: ICD-10-CM

## 2021-12-21 RX ORDER — GABAPENTIN 100 MG/1
CAPSULE ORAL
Qty: 90 CAPSULE | Refills: 1 | Status: SHIPPED | OUTPATIENT
Start: 2021-12-21 | End: 2022-04-26

## 2021-12-21 NOTE — TELEPHONE ENCOUNTER
See [employer] message. Patient requesting refill of gabapentin. Last office visit 9/3/21. RN routing to PCP to refill if appropriate.     Toya Márquez RN

## 2022-01-26 DIAGNOSIS — R10.2 PELVIC PAIN IN FEMALE: ICD-10-CM

## 2022-01-26 RX ORDER — CYCLOBENZAPRINE HCL 5 MG
5 TABLET ORAL 3 TIMES DAILY PRN
Qty: 60 TABLET | Refills: 3 | Status: SHIPPED | OUTPATIENT
Start: 2022-01-26 | End: 2022-08-01

## 2022-01-31 ENCOUNTER — PRE VISIT (OUTPATIENT)
Dept: UROLOGY | Facility: CLINIC | Age: 52
End: 2022-01-31
Payer: COMMERCIAL

## 2022-01-31 NOTE — TELEPHONE ENCOUNTER
MEDICAL RECORDS REQUEST   [facility]  Urology Clinic  [address] [place] [address]  PHONE: [phone]  Fax: [phone]        FUTURE VISIT INFORMATION                                                   Laya FELIPE Alistair : 1970 scheduled for future visit at [facility]    APPOINTMENT INFORMATION:    Date: 2022    Provider:  Raul Alvarado MD    Reason for Visit/Diagnosis: Interstitial cystitis, kidney stones    REFERRAL INFORMATION:    Referring provider:  Barb Lemus MD    Specialty: N/A    Referring providers clinic:  [facility]    Clinic contact number:  N/A    RECORDS REQUESTED FOR VISIT                                                     NOTES  STATUS/DETAILS   OFFICE NOTE from referring provider  yes, 2021 -- Barb Lemus MD in [facility]   MEDICATION LIST  yes   LABS     URINALYSIS (UA)  yes, , ,    URINE CYTOLOGY  no   KIDNEY STONE     CT STONE  no   IMAGING (IMAGES & REPORT)  yes, 2021 -- CT ABD Pelvis -- [facility]   KUB  no     PRE-VISIT CHECKLIST      Record collection complete yes   Appointment appropriately scheduled           (right time/right provider) Yes   Joint diagnostic appointment coordinated correctly          (ensure right order & amount of time) Yes   MyChart activation Yes   Questionnaire complete If no, please explain pending     Action 2022 JTV 11:20am   Action Taken [facility] sent a request to [facility] for images to be pushed.      Action 2022 JTV 10:36am   Action Taken [facility] received and resolved images from [facility].

## 2022-02-13 ENCOUNTER — HEALTH MAINTENANCE LETTER (OUTPATIENT)
Age: 52
End: 2022-02-13

## 2022-02-17 ENCOUNTER — PRE VISIT (OUTPATIENT)
Dept: UROLOGY | Facility: CLINIC | Age: 52
End: 2022-02-17
Payer: COMMERCIAL

## 2022-02-17 NOTE — TELEPHONE ENCOUNTER
Reason for Visit: Consult    Diagnosis: Interstitial cystitis, kidney stones    Orders/Procedures/Records: in system    Contact Patient: n/a    Rooming Requirements: Normal      [facility]  02/17/22  10:43 AM

## 2022-02-20 DIAGNOSIS — Z11.59 ENCOUNTER FOR SCREENING FOR OTHER VIRAL DISEASES: Primary | ICD-10-CM

## 2022-02-23 ENCOUNTER — VIRTUAL VISIT (OUTPATIENT)
Dept: UROLOGY | Facility: CLINIC | Age: 52
End: 2022-02-23
Payer: COMMERCIAL

## 2022-02-23 DIAGNOSIS — R39.89 BLADDER PAIN: Primary | ICD-10-CM

## 2022-02-23 DIAGNOSIS — R39.15 URINARY URGENCY: ICD-10-CM

## 2022-02-23 DIAGNOSIS — N20.0 NEPHROLITHIASIS: ICD-10-CM

## 2022-02-23 DIAGNOSIS — R35.0 FREQUENCY OF URINATION: ICD-10-CM

## 2022-02-23 PROCEDURE — 99205 OFFICE O/P NEW HI 60 MIN: CPT | Mod: 95 | Performed by: UROLOGY

## 2022-02-23 RX ORDER — ACETAMINOPHEN 500 MG
1 TABLET ORAL
COMMUNITY

## 2022-02-23 NOTE — LETTER
2/23/2022       RE: Laya Sainz  [address]  [place] [facility] [address]     Dear Colleague,    Thank you for referring your patient, Laya Sainz, to the [facility]OLOGY [facility] [place] at [facility]. Please see a copy of my visit note below.    Laya is a 51 year old who is being evaluated via a billable video visit.      How would you like to obtain your AVS? MyChart  If the video visit is dropped, the invitation should be resent by: Send to e-mail at: [email or web address]  Will anyone else be joining your video visit? No      Video Start Time: 9:30 AM  Video-Visit Details    Type of service:  Video Visit    Video End Time:10:03 AM    Originating Location (pt. Location): Home    Distant Location (provider location):  [facility]OLOG[facility]     Platform used for Video Visit: [employer]    HPI:  Laya Sainz is a 51 year old female being seen for bladder pain which started in 2020, but then she made diet changes and this improved.  Then in Jan of 2022 she had a flare-up, not as bad as before but bothersome enough.  She was introducing some foods  Over the holidays.  She also may had overdone it with walking her dog in icy conditions.    The pain is also associated with urgency and frequency.      She also has an occasion pain in her left lower back and that corresponds to her bladder pain as well.  It also corresponds with constipation and pain.    She also has a tail bone injury at a young age.    She has a hx of kidney stones.  She had an appendectomy in August of 2021.    Reviewed previous notes from Dr. Mendoza from 9/3/21    Exam:  LMP  (LMP Unknown)   GENERAL: Healthy, alert and no distress  EYES: Eyes grossly normal to inspection.  No discharge or erythema, or obvious scleral/conjunctival abnormalities.  RESP: No audible wheeze, cough, or visible cyanosis.  No visible retractions or increased work of breathing.    SKIN:  Visible skin clear. No significant rash, abnormal pigmentation or lesions.  NEURO: Cranial nerves grossly intact.  Mentation and speech appropriate for age.  PSYCH: Mentation appears normal, affect normal/bright, judgement and insight intact, normal speech and appearance well-groomed.    Review of Imaging:  The following imaging exams were independently viewed and interpreted by me and discussed with patient:    CT Scan Abd/Pelvis 8/25/21:  1. Acute uncomplicated appendicitis.   2. Nonobstructing calculi in the lower pole of the left kidney measuring up to 0.4 cm.   3. Incidentally noted 0.4 cm pulmonary nodule in the right middle lobe. Consider optional follow up CT chest in 12 months, per Fleischner guidelines    MRI 1/12/2020:  She has left sided inflammation at her facet at L5-S1    Review of Labs:  The following labs were reviewed by me and discussed with the patient:    Lab Results   Component Value Date    HGB 13.6 01/27/2020     Last Comprehensive Metabolic Panel:  Sodium   Date Value Ref Range Status   12/07/2020 139 133 - 144 mmol/L Final     Potassium   Date Value Ref Range Status   12/07/2020 3.9 3.4 - 5.3 mmol/L Final     Chloride   Date Value Ref Range Status   12/07/2020 108 94 - 109 mmol/L Final     Carbon Dioxide   Date Value Ref Range Status   12/07/2020 27 20 - 32 mmol/L Final     Anion Gap   Date Value Ref Range Status   12/07/2020 5 3 - 14 mmol/L Final     Glucose   Date Value Ref Range Status   12/07/2020 82 70 - 99 mg/dL Final     Urea Nitrogen   Date Value Ref Range Status   12/07/2020 14 7 - 30 mg/dL Final     Creatinine   Date Value Ref Range Status   12/07/2020 0.58 0.52 - 1.04 mg/dL Final     GFR Estimate   Date Value Ref Range Status   12/07/2020 >90 >60 mL/min/[1.73_m2] Final     Comment:     Non  GFR Calc  Starting 12/18/2018, serum creatinine based estimated GFR (eGFR) will be   calculated using the Chronic Kidney Disease Epidemiology Collaboration   (CKD-EPI)  equation.       Calcium   Date Value Ref Range Status   12/07/2020 9.1 8.5 - 10.1 mg/dL Final       Assessment & Plan   52 y/o female with bladder pain, urgency, frequency, in the setting of multiple inflammatory conditions including at spinal facet at L5-S1 as well as bowel issues.  We discussed a possible inflammatory cause of her symptoms as well as a neurological cause.    She also has 4 mm LLP stone that is asymptomatic.  We discussed observation and prevention including limiting salt intake and staying well hydrated.  -recommend patient see a Rheumatologist  -consider spinal steroid injection for the inflammation  -continue pelvic floor PT exercises  -if she has a flare, then patient will call for a cystoscopy  -send information on sacral nerve stimulation both axonics and medtronic      Raul Alvarado MD  [facility]OLOGY CLINIC [place]      ==========================      Additional Coding Information:    Time spent:  60 minutes spent on the date of the encounter doing chart review, history and exam, documentation and further activities per the note

## 2022-02-23 NOTE — PROGRESS NOTES
Laya is a 51 year old who is being evaluated via a billable video visit.      How would you like to obtain your AVS? MyChart  If the video visit is dropped, the invitation should be resent by: Send to e-mail at: [email or web address]  Will anyone else be joining your video visit? No      Video Start Time: 9:30 AM  Video-Visit Details    Type of service:  Video Visit    Video End Time:10:03 AM    Originating Location (pt. Location): Home    Distant Location (provider location):  [facility] UROLOGY CLINIC [place]     Platform used for Video Visit: [employer]

## 2022-02-23 NOTE — PATIENT INSTRUCTIONS
-recommend patient see a Rheumatologist  -consider spinal steroid injection for the inflammation  -continue pelvic floor PT exercises  -if she has a flare, then patient will call for a cystoscopy  -send information on sacral nerve stimulation both [employer] and [employer]

## 2022-02-23 NOTE — PROGRESS NOTES
HPI:  Laya Sainz is a 51 year old female being seen for bladder pain which started in 2020, but then she made diet changes and this improved.  Then in Jan of 2022 she had a flare-up, not as bad as before but bothersome enough.  She was introducing some foods  Over the holidays.  She also may had overdone it with walking her dog in icy conditions.    The pain is also associated with urgency and frequency.      She also has an occasion pain in her left lower back and that corresponds to her bladder pain as well.  It also corresponds with constipation and pain.    She also has a tail bone injury at a young age.    She has a hx of kidney stones.  She had an appendectomy in August of 2021.    Reviewed previous notes from Dr. Mendoza from 9/3/21    Exam:  LMP  (LMP Unknown)   GENERAL: Healthy, alert and no distress  EYES: Eyes grossly normal to inspection.  No discharge or erythema, or obvious scleral/conjunctival abnormalities.  RESP: No audible wheeze, cough, or visible cyanosis.  No visible retractions or increased work of breathing.    SKIN: Visible skin clear. No significant rash, abnormal pigmentation or lesions.  NEURO: Cranial nerves grossly intact.  Mentation and speech appropriate for age.  PSYCH: Mentation appears normal, affect normal/bright, judgement and insight intact, normal speech and appearance well-groomed.    Review of Imaging:  The following imaging exams were independently viewed and interpreted by me and discussed with patient:    CT Scan Abd/Pelvis 8/25/21:  1. Acute uncomplicated appendicitis.   2. Nonobstructing calculi in the lower pole of the left kidney measuring up to 0.4 cm.   3. Incidentally noted 0.4 cm pulmonary nodule in the right middle lobe. Consider optional follow up CT chest in 12 months, per Fleischner guidelines    MRI 1/12/2020:  She has left sided inflammation at her facet at L5-S1    Review of Labs:  The following labs were reviewed by me and discussed with the  patient:    Lab Results   Component Value Date    HGB 13.6 01/27/2020     Last Comprehensive Metabolic Panel:  Sodium   Date Value Ref Range Status   12/07/2020 139 133 - 144 mmol/L Final     Potassium   Date Value Ref Range Status   12/07/2020 3.9 3.4 - 5.3 mmol/L Final     Chloride   Date Value Ref Range Status   12/07/2020 108 94 - 109 mmol/L Final     Carbon Dioxide   Date Value Ref Range Status   12/07/2020 27 20 - 32 mmol/L Final     Anion Gap   Date Value Ref Range Status   12/07/2020 5 3 - 14 mmol/L Final     Glucose   Date Value Ref Range Status   12/07/2020 82 70 - 99 mg/dL Final     Urea Nitrogen   Date Value Ref Range Status   12/07/2020 14 7 - 30 mg/dL Final     Creatinine   Date Value Ref Range Status   12/07/2020 0.58 0.52 - 1.04 mg/dL Final     GFR Estimate   Date Value Ref Range Status   12/07/2020 >90 >60 mL/min/[1.73_m2] Final     Comment:     Non  GFR Calc  Starting 12/18/2018, serum creatinine based estimated GFR (eGFR) will be   calculated using the Chronic Kidney Disease Epidemiology Collaboration   (CKD-EPI) equation.       Calcium   Date Value Ref Range Status   12/07/2020 9.1 8.5 - 10.1 mg/dL Final       Assessment & Plan   50 y/o female with bladder pain, urgency, frequency, in the setting of multiple inflammatory conditions including at spinal facet at L5-S1 as well as bowel issues.  We discussed a possible inflammatory cause of her symptoms as well as a neurological cause.    She also has 4 mm LLP stone that is asymptomatic.  We discussed observation and prevention including limiting salt intake and staying well hydrated.  -recommend patient see a Rheumatologist  -consider spinal steroid injection for the inflammation  -continue pelvic floor PT exercises  -if she has a flare, then patient will call for a cystoscopy  -send information on sacral nerve stimulation both [email or web address] and medtronic      Raul Alvarado MD  [facility] UROLOGY CLINIC  [place]      ==========================      Additional Coding Information:    Time spent:  60 minutes spent on the date of the encounter doing chart review, history and exam, documentation and further activities per the note

## 2022-03-14 ENCOUNTER — TRANSFERRED RECORDS (OUTPATIENT)
Dept: HEALTH INFORMATION MANAGEMENT | Facility: CLINIC | Age: 52
End: 2022-03-14
Payer: COMMERCIAL

## 2022-03-25 ENCOUNTER — LAB (OUTPATIENT)
Dept: LAB | Facility: CLINIC | Age: 52
End: 2022-03-25
Attending: COLON & RECTAL SURGERY
Payer: COMMERCIAL

## 2022-03-25 DIAGNOSIS — Z11.59 ENCOUNTER FOR SCREENING FOR OTHER VIRAL DISEASES: ICD-10-CM

## 2022-03-25 PROCEDURE — U0005 INFEC AGEN DETEC AMPLI PROBE: HCPCS

## 2022-03-25 PROCEDURE — U0003 INFECTIOUS AGENT DETECTION BY NUCLEIC ACID (DNA OR RNA); SEVERE ACUTE RESPIRATORY SYNDROME CORONAVIRUS 2 (SARS-COV-2) (CORONAVIRUS DISEASE [COVID-19]), AMPLIFIED PROBE TECHNIQUE, MAKING USE OF HIGH THROUGHPUT TECHNOLOGIES AS DESCRIBED BY CMS-2020-01-R: HCPCS

## 2022-03-26 LAB — SARS-COV-2 RNA RESP QL NAA+PROBE: NEGATIVE

## 2022-03-29 ENCOUNTER — HOSPITAL ENCOUNTER (OUTPATIENT)
Facility: CLINIC | Age: 52
Discharge: HOME | End: 2022-03-29
Attending: COLON & RECTAL SURGERY | Admitting: COLON & RECTAL SURGERY
Payer: COMMERCIAL

## 2022-03-29 VITALS
HEIGHT: 65 IN | SYSTOLIC BLOOD PRESSURE: 75 MMHG | RESPIRATION RATE: 16 BRPM | DIASTOLIC BLOOD PRESSURE: 54 MMHG | WEIGHT: 113 LBS | OXYGEN SATURATION: 100 % | TEMPERATURE: 98 F | HEART RATE: 71 BPM | BODY MASS INDEX: 18.83 KG/M2

## 2022-03-29 LAB — COLONOSCOPY: NORMAL

## 2022-03-29 PROCEDURE — 250N000011 HC RX IP 250 OP 636: Performed by: COLON & RECTAL SURGERY

## 2022-03-29 PROCEDURE — 99153 MOD SED SAME PHYS/QHP EA: CPT | Performed by: COLON & RECTAL SURGERY

## 2022-03-29 PROCEDURE — 258N000003 HC RX IP 258 OP 636: Performed by: COLON & RECTAL SURGERY

## 2022-03-29 PROCEDURE — G0121 COLON CA SCRN NOT HI RSK IND: HCPCS | Performed by: COLON & RECTAL SURGERY

## 2022-03-29 PROCEDURE — G0500 MOD SEDAT ENDO SERVICE >5YRS: HCPCS | Performed by: COLON & RECTAL SURGERY

## 2022-03-29 PROCEDURE — 45378 DIAGNOSTIC COLONOSCOPY: CPT | Performed by: COLON & RECTAL SURGERY

## 2022-03-29 RX ORDER — NALOXONE HYDROCHLORIDE 0.4 MG/ML
0.4 INJECTION, SOLUTION INTRAMUSCULAR; INTRAVENOUS; SUBCUTANEOUS
Status: DISCONTINUED | OUTPATIENT
Start: 2022-03-29 | End: 2022-03-29 | Stop reason: HOSPADM

## 2022-03-29 RX ORDER — FENTANYL CITRATE 0.05 MG/ML
50-100 INJECTION, SOLUTION INTRAMUSCULAR; INTRAVENOUS EVERY 5 MIN PRN
Status: DISCONTINUED | OUTPATIENT
Start: 2022-03-29 | End: 2022-03-29 | Stop reason: HOSPADM

## 2022-03-29 RX ORDER — ONDANSETRON 2 MG/ML
4 INJECTION INTRAMUSCULAR; INTRAVENOUS
Status: DISCONTINUED | OUTPATIENT
Start: 2022-03-29 | End: 2022-03-29 | Stop reason: HOSPADM

## 2022-03-29 RX ORDER — ATROPINE SULFATE 0.4 MG/ML
1 AMPUL (ML) INJECTION
Status: DISCONTINUED | OUTPATIENT
Start: 2022-03-29 | End: 2022-03-29 | Stop reason: HOSPADM

## 2022-03-29 RX ORDER — LIDOCAINE 40 MG/G
CREAM TOPICAL
Status: DISCONTINUED | OUTPATIENT
Start: 2022-03-29 | End: 2022-03-29 | Stop reason: HOSPADM

## 2022-03-29 RX ORDER — FLUMAZENIL 0.1 MG/ML
0.2 INJECTION, SOLUTION INTRAVENOUS
Status: DISCONTINUED | OUTPATIENT
Start: 2022-03-29 | End: 2022-03-29 | Stop reason: HOSPADM

## 2022-03-29 RX ORDER — PROCHLORPERAZINE MALEATE 10 MG
10 TABLET ORAL EVERY 6 HOURS PRN
Status: DISCONTINUED | OUTPATIENT
Start: 2022-03-29 | End: 2022-03-29 | Stop reason: HOSPADM

## 2022-03-29 RX ORDER — SIMETHICONE 40MG/0.6ML
133 SUSPENSION, DROPS(FINAL DOSAGE FORM)(ML) ORAL
Status: DISCONTINUED | OUTPATIENT
Start: 2022-03-29 | End: 2022-03-29 | Stop reason: HOSPADM

## 2022-03-29 RX ORDER — ONDANSETRON 2 MG/ML
4 INJECTION INTRAMUSCULAR; INTRAVENOUS EVERY 6 HOURS PRN
Status: DISCONTINUED | OUTPATIENT
Start: 2022-03-29 | End: 2022-03-29 | Stop reason: HOSPADM

## 2022-03-29 RX ORDER — SODIUM CHLORIDE 9 MG/ML
INJECTION, SOLUTION INTRAVENOUS CONTINUOUS PRN
Status: COMPLETED | OUTPATIENT
Start: 2022-03-29 | End: 2022-03-29

## 2022-03-29 RX ORDER — EPINEPHRINE 1 MG/ML
0.1 INJECTION, SOLUTION INTRAMUSCULAR; SUBCUTANEOUS
Status: DISCONTINUED | OUTPATIENT
Start: 2022-03-29 | End: 2022-03-29 | Stop reason: HOSPADM

## 2022-03-29 RX ORDER — NALOXONE HYDROCHLORIDE 0.4 MG/ML
0.2 INJECTION, SOLUTION INTRAMUSCULAR; INTRAVENOUS; SUBCUTANEOUS
Status: DISCONTINUED | OUTPATIENT
Start: 2022-03-29 | End: 2022-03-29 | Stop reason: HOSPADM

## 2022-03-29 RX ORDER — DIPHENHYDRAMINE HYDROCHLORIDE 50 MG/ML
25-50 INJECTION INTRAMUSCULAR; INTRAVENOUS
Status: DISCONTINUED | OUTPATIENT
Start: 2022-03-29 | End: 2022-03-29 | Stop reason: HOSPADM

## 2022-03-29 RX ORDER — ONDANSETRON 4 MG/1
4 TABLET, ORALLY DISINTEGRATING ORAL EVERY 6 HOURS PRN
Status: DISCONTINUED | OUTPATIENT
Start: 2022-03-29 | End: 2022-03-29 | Stop reason: HOSPADM

## 2022-03-29 RX ADMIN — MIDAZOLAM 2 MG: 1 INJECTION INTRAMUSCULAR; INTRAVENOUS at 09:42

## 2022-03-29 RX ADMIN — MIDAZOLAM 1 MG: 1 INJECTION INTRAMUSCULAR; INTRAVENOUS at 09:57

## 2022-03-29 RX ADMIN — FENTANYL CITRATE 50 MCG: 0.05 INJECTION, SOLUTION INTRAMUSCULAR; INTRAVENOUS at 09:53

## 2022-03-29 RX ADMIN — SODIUM CHLORIDE 125 ML/HR: 9 INJECTION, SOLUTION INTRAVENOUS at 09:13

## 2022-03-29 RX ADMIN — MIDAZOLAM 1 MG: 1 INJECTION INTRAMUSCULAR; INTRAVENOUS at 09:49

## 2022-03-29 RX ADMIN — FENTANYL CITRATE 100 MCG: 0.05 INJECTION, SOLUTION INTRAMUSCULAR; INTRAVENOUS at 09:42

## 2022-03-29 NOTE — DISCHARGE INSTRUCTIONS
Understanding Diverticulosis and Diverticulitis     Pouches or diverticula usually occur in the lower part of the colon called the sigmoid.      Diverticulitis occurs when the pouches become inflamed.     The colon (large intestine) is the last part of the digestive tract. It absorbs water from stool and changes it from a liquid to a solid. In certain cases, small pouches called diverticula can form in the colon wall. This condition is called diverticulosis. The pouches can become infected. If this happens, it becomes a more serious problem called diverticulitis. These problems can be painful. But they can be managed.   Managing Your Condition  Diet changes or taking medications are often tried first. These may be enough to bring relief. If the case is bad, surgery may be done. You and your doctor can discuss the plan that is best for you.  If You Have Diverticulosis  Diet changes are often enough to control symptoms. The main changes are adding fiber (roughage) and drinking more water. Fiber absorbs water as it travels through your colon. This helps your stool stay soft and move smoothly. Water helps this process. If needed, you may be told to take over-the-counter stool softeners. To help relieve pain, antispasmodic medications may be prescribed.  If You Have Diverticulitis  Treatment depends on how bad your symptoms are.  For mild symptoms: You may be put on a liquid diet for a short time. You may also be prescribed antibiotics. If these two steps relieve your symptoms, you may then be prescribed a high-fiber diet. If you still have symptoms, your doctor will discuss further treatment options with you.  For severe symptoms: You may need to be admitted to the hospital. There, you can be given IV antibiotics and fluids. Once symptoms are under control, the above treatments may be tried. If these don t control your condition, your doctor may discuss the option of having surgery with you.  [place] to Colon  Health  Help keep your colon healthy with a diet that includes plenty of high-fiber fruits, vegetables, and whole grains. Drink plenty of liquids like water and juice. Your doctor may also recommend avoiding seeds and nuts.          5130-0407 [employer] [facility], [address] [place] [address], [place] [address]. All rights reserved. This information is not intended as a substitute for professional medical care. Always follow your healthcare professional's instructions.    Eating a High-Fiber Diet  Fiber is what gives strength and structure to plants. Most grains, beans, vegetables, and fruits contain fiber. Foods rich in fiber are often low in calories and fat, and they fill you up more. They may also reduce your risks for certain health problems. To find out the amount of fiber in canned, packaged, or frozen foods, read the  Nutrition Facts  label. It tells you how much fiber is in a serving.      Types of Fiber and Their Benefits  There are two types of fiber: insoluble and soluble. They both aid digestion and help you maintain a healthy weight.  Insoluble fiber: This is found in whole grains, cereals, certain fruits and vegetables (such as apple skin, corn, and carrots). Insoluble fiber may prevent constipation and reduce the risk of certain types of cancer.   Soluble fiber: This type of fiber is in oats, beans, and certain fruits and vegetables (such as strawberries and peas). Soluble fiber can reduce cholesterol (which may help lower the risk of heart disease), and helps control blood sugar levels.  Look for High-Fiber Foods  Whole-grain breads and cereals: Try to eat 6-8 ounces a day. Include wheat and oat bran cereals, whole-wheat muffins or toast, and corn tortillas in your meals.  Fruits: Try to eat 2 cups a day. Apples, oranges, strawberries, pears, and bananas are good sources. (Note: Fruit juice is low in fiber.)  Vegetables: Try to eat 3 cups a day. Add asparagus, carrots, broccoli, peas, and corn to your  meals.  Legumes (beans): One cup of cooked lentils gives you over 15 grams of fiber. Try navy beans, lentils, and chickpeas.  Seeds:  A small handful of seeds gives you about 3 grams of fiber. Try sunflower seeds.    Keep Track of Your Fiber  A healthy diet includes 31 grams of fiber a day if you have a 2,000-calorie diet. Keep track of how much fiber you eat. Start by reading food labels. Then eat a variety of foods high in fiber. Ask your doctor about supplemental fiber products.            1108-2103 [employer], [address] [place] [address]. All rights reserved. This information is not intended as a substitute for professional medical care. Always follow your healthcare professional's instructions.    Hemorrhoids    Hemorrhoids are swollen and inflamed veins inside the rectum and near the anus. The rectum is the last several inches of the colon. The anus is the passage between the rectum and the outside of the body.   Causes  The veins can become swollen due to increased pressure in them. This is most often caused by:     Chronic constipation or diarrhea    Straining when having a bowel movement    Sitting too long on the toilet    A low-fiber diet    Pregnancy  Symptoms    Bleeding from the rectum. You may notice this after bowel movements.    Lump near the anus    Itching around the anus    Pain around the anus    Mucus leaks from the anus  There are different types of hemorrhoids. Depending on the type you have and the severity, you may be able to treat yourself at home. In some cases, a procedure may be the best treatment option. Your healthcare provider can tell you more about this, if needed.   Home care  General care    To get relief from pain or itching, try:  ? Medicines. Your healthcare provider may recommend stool softeners, suppositories, or laxatives to help manage constipation. Use these exactly as directed.  ? Sitz baths. A sitz bath involves sitting in a few inches of warm bath  water. Be careful not to make the water so hot that you burn yourself--test it before sitting in it. Soak for about 10 to 15 minutes a few times a day. This may help relieve pain.  ? Topical products. Your healthcare provider may prescribe or recommend creams, ointments, or pads that can be applied to the hemorrhoid. Use these exactly as directed.  Tips to help prevent hemorrhoids     Eat more fiber. Fiber adds bulk to stool and absorbs water as it moves through your colon. This makes stool softer and easier to pass.  ? Increase the fiber in your diet with more fiber-rich foods. These include fresh fruit, vegetables, and whole grains.  ? Take a fiber supplement or bulking agent, if advised by your healthcare provider. These include products such as psyllium or methylcellulose.    Drink more water. Your healthcare provider may direct you to drink plenty of water. This can help keep stool soft.    Be more active. Frequent exercise aids digestion and helps prevent constipation. It may also help make bowel movements more regular.    Don t strain during bowel movements. This can make hemorrhoids more likely. Also, don t sit on the toilet for long periods of time.    Follow-up care  Follow up with your healthcare provider as advised. If a culture or imaging tests were done, someone will let you know the results when they are ready. This may take a few days or longer. If your healthcare provider recommends a procedure for your hemorrhoids, these options can be discussed. Options may include surgery and outpatient office treatments.   When to seek medical advice  Call your healthcare provider right away if any of these occur:     Increased bleeding from the rectum    Increased pain around the rectum or anus    Weakness or dizziness  Call 911  Call 911 if any of these occur:     Trouble breathing or swallowing    Fainting or loss of consciousness    Unusually fast heart rate    Vomiting blood    Large amounts of blood in  stool or black, tarry stools  [employer] last reviewed this educational content on 8/1/2019 2000-2021 [employer]. All rights reserved. This information is not intended as a substitute for professional medical care. Always follow your healthcare professional's instructions.

## 2022-03-29 NOTE — H&P
Pre-Endoscopy History and Physical     Laya Sainz MRN# [id number]   YOB: 1970 Age: 51 year old     Date of Procedure: 3/29/2022  Primary care provider: Barb Lemus  Type of Endoscopy: colonoscopy  Reason for Procedure: screening, anal pain  Type of Anesthesia Anticipated: Moderate Sedation    HPI:    Laya is a 51 year old female who will be undergoing the above procedure.      A history and physical has been performed. The patient's medications and allergies have been reviewed. The risks and benefits of the procedure and the sedation options and risks were discussed with the patient.  All questions were answered and informed consent was obtained.      She denies a personal or family history of anesthesia complications or bleeding disorders.     Allergies   Allergen Reactions     Seasonal Allergies         Prior to Admission Medications   Prescriptions Last Dose Informant Patient Reported? Taking?   Probiotic, Lactobacillus, CAPS   Yes No   Sig: Take 1 capsule by mouth   cetirizine (ZYRTEC) 10 MG tablet   Yes No   Sig: Take 10 mg by mouth daily   cyclobenzaprine (FLEXERIL) 5 MG tablet Past Week at Unknown time  No Yes   Sig: Take 1 tablet (5 mg) by mouth 3 times daily as needed for muscle spasms   diphenhydrAMINE (BENADRYL) 25 MG tablet   Yes No   Sig: Take 25 mg by mouth daily   Patient not taking: Reported on 2/23/2022   fexofenadine (ALLEGRA) 180 MG tablet   Yes No   Sig: Take 180 mg by mouth as needed   gabapentin (NEURONTIN) 100 MG capsule 3/28/2022 at Unknown time  No Yes   Sig: Take one at bedtime and up to 2 a day as needed.   ibuprofen (ADVIL/MOTRIN) 200 MG capsule   Yes No   Sig: Take 200 mg by mouth every 4 hours as needed for fever   magnesium oxide (MAG-OX) 400 MG tablet   Yes No   Sig: Take 1 tablet by mouth daily   pseudoePHEDrine (SUDAFED) 120 MG 12 hr tablet   Yes No   Sig: Take 120 mg by mouth as needed   Patient not taking: Reported on 2/23/2022      Facility-Administered  Medications: None       Patient Active Problem List   Diagnosis     Endometriosis     Recurrent UTI     Abnormal Pap smear of cervix     Diverticulosis of large intestine without hemorrhage     Interstitial cystitis     Irritable bowel syndrome with both constipation and diarrhea     H/O abdominal supracervical subtotal hysterectomy     Vasovagal syncope        History reviewed. No pertinent past medical history.     Past Surgical History:   Procedure Laterality Date     ABDOMEN SURGERY   &     lap laser for endometriosis     COLONOSCOPY  2016    Dr. Russell [facility]     COLONOSCOPY N/A 2016    Procedure: COLONOSCOPY;  Surgeon: Mona Russell MD;  Location:  GI     ENT SURGERY      T&A as a child     GYN SURGERY      hysterectomy     HEAD & NECK SURGERY      wisdom teeth       Social History     Tobacco Use     Smoking status: Former Smoker     Packs/day: 0.00     Quit date: 10/4/2004     Years since quittin.4     Smokeless tobacco: Never Used   Substance Use Topics     Alcohol use: Yes     Comment: 2 drinks once a week       Family History   Problem Relation Age of Onset     Thyroid Disease Mother      Asthma Father      Thyroid Disease Daughter      Sjogren's Daughter      Lupus Daughter      Diabetes No family hx of      Coronary Artery Disease No family hx of      Hypertension No family hx of      Hyperlipidemia No family hx of      Cerebrovascular Disease No family hx of      Breast Cancer No family hx of      Prostate Cancer No family hx of      Colon Cancer No family hx of      Other Cancer No family hx of      Depression No family hx of      Anxiety Disorder No family hx of      Mental Illness No family hx of      Substance Abuse No family hx of      Anesthesia Reaction No family hx of      Osteoporosis No family hx of      Genetic Disorder No family hx of      Obesity No family hx of      Unknown/Adopted No family hx of        REVIEW OF SYSTEMS:     5 point ROS negative  "except as noted above in HPI, including Gen., Resp., CV, GI &  system review.      PHYSICAL EXAM:   Ht 1.651 m (5' 5\")   Wt 51.3 kg (113 lb)   LMP  (LMP Unknown)   BMI 18.80 kg/m   Estimated body mass index is 18.8 kg/m  as calculated from the following:    Height as of this encounter: 1.651 m (5' 5\").    Weight as of this encounter: 51.3 kg (113 lb).   GENERAL APPEARANCE: healthy and alert  MENTAL STATUS: alert  AIRWAY EXAM: Mallampatti Class II (visualization of the soft palate, fauces, and uvula)  RESP: lungs clear to auscultation - no rales, rhonchi or wheezes  CV: regular rates and rhythm      DIAGNOSTICS:    Not indicated      IMPRESSION   ASA Class 2 - Mild systemic disease        PLAN:       Plan for colonoscopy. We discussed the risks, benefits and alternatives and the patient wished to proceed.    The above has been forwarded to the consulting provider.      Signed Electronically by: Mona Russell MD, MD  March 29, 2022    "

## 2022-04-10 ENCOUNTER — HEALTH MAINTENANCE LETTER (OUTPATIENT)
Age: 52
End: 2022-04-10

## 2022-04-25 DIAGNOSIS — R10.2 PELVIC PAIN IN FEMALE: ICD-10-CM

## 2022-04-25 NOTE — TELEPHONE ENCOUNTER
"Request for medication refill:  gabapentin (NEURONTIN) 100 MG capsule    Providers if patient needs an appointment and you are willing to give a one month supply please refill for one month and  send a letter/MyChart using \".SMILLIMITEDREFILL\" .smillimited and route chart to \"P SMI \" (Giving one month refill in non controlled medications is strongly recommended before denial)    If refill has been denied, meaning absolutely no refills without visit, please complete the smart phrase \".smirxrefuse\" and route it to the \"P [facility] MED REFILLS\"  pool to inform the patient and the pharmacy.    Judi Fletcher MA        "

## 2022-04-26 RX ORDER — GABAPENTIN 100 MG/1
CAPSULE ORAL
Qty: 90 CAPSULE | Refills: 1 | Status: SHIPPED | OUTPATIENT
Start: 2022-04-26 | End: 2022-10-24

## 2022-05-02 ENCOUNTER — IMMUNIZATION (OUTPATIENT)
Dept: NURSING | Facility: CLINIC | Age: 52
End: 2022-05-02
Payer: COMMERCIAL

## 2022-05-02 PROCEDURE — 0064A COVID-19,PF,MODERNA (18+ YRS BOOSTER .25ML): CPT

## 2022-05-02 PROCEDURE — 91306 COVID-19,PF,MODERNA (18+ YRS BOOSTER .25ML): CPT

## 2022-08-01 DIAGNOSIS — R10.2 PELVIC PAIN IN FEMALE: ICD-10-CM

## 2022-08-01 RX ORDER — CYCLOBENZAPRINE HCL 5 MG
5 TABLET ORAL 3 TIMES DAILY PRN
Qty: 60 TABLET | Refills: 3 | Status: SHIPPED | OUTPATIENT
Start: 2022-08-01 | End: 2023-02-20

## 2022-10-15 ENCOUNTER — HEALTH MAINTENANCE LETTER (OUTPATIENT)
Age: 52
End: 2022-10-15

## 2022-10-24 DIAGNOSIS — R10.2 PELVIC PAIN IN FEMALE: ICD-10-CM

## 2022-10-24 RX ORDER — GABAPENTIN 100 MG/1
CAPSULE ORAL
Qty: 90 CAPSULE | Refills: 1 | Status: SHIPPED | OUTPATIENT
Start: 2022-10-24 | End: 2023-04-12

## 2022-10-24 NOTE — TELEPHONE ENCOUNTER

## 2022-11-17 ENCOUNTER — TRANSFERRED RECORDS (OUTPATIENT)
Dept: HEALTH INFORMATION MANAGEMENT | Facility: CLINIC | Age: 52
End: 2022-11-17

## 2022-11-23 PROBLEM — R00.2 PALPITATIONS: Status: ACTIVE | Noted: 2022-11-23

## 2022-11-30 ENCOUNTER — MYC MEDICAL ADVICE (OUTPATIENT)
Dept: FAMILY MEDICINE | Facility: CLINIC | Age: 52
End: 2022-11-30

## 2022-11-30 DIAGNOSIS — R91.1 INCIDENTAL LUNG NODULE, > 3MM AND < 8MM: Primary | ICD-10-CM

## 2022-12-27 ENCOUNTER — TRANSFERRED RECORDS (OUTPATIENT)
Dept: HEALTH INFORMATION MANAGEMENT | Facility: CLINIC | Age: 52
End: 2022-12-27

## 2023-01-30 ASSESSMENT — ENCOUNTER SYMPTOMS
HEMATOCHEZIA: 0
CHILLS: 0
SORE THROAT: 0
PARESTHESIAS: 0
EYE PAIN: 0
WEAKNESS: 0
HEMATURIA: 0
DIZZINESS: 0
JOINT SWELLING: 0
HEADACHES: 0
DIARRHEA: 0
MYALGIAS: 0
CONSTIPATION: 0
HEARTBURN: 0
NAUSEA: 0
NERVOUS/ANXIOUS: 0
BREAST MASS: 0
ARTHRALGIAS: 0
SHORTNESS OF BREATH: 0
PALPITATIONS: 0
FREQUENCY: 0
ABDOMINAL PAIN: 0
FEVER: 0
DYSURIA: 0
COUGH: 0

## 2023-02-06 ENCOUNTER — OFFICE VISIT (OUTPATIENT)
Dept: FAMILY MEDICINE | Facility: CLINIC | Age: 53
End: 2023-02-06
Payer: COMMERCIAL

## 2023-02-06 VITALS
SYSTOLIC BLOOD PRESSURE: 118 MMHG | OXYGEN SATURATION: 100 % | RESPIRATION RATE: 16 BRPM | TEMPERATURE: 97.8 F | DIASTOLIC BLOOD PRESSURE: 80 MMHG | HEIGHT: 65 IN | BODY MASS INDEX: 19.66 KG/M2 | WEIGHT: 118 LBS | HEART RATE: 54 BPM

## 2023-02-06 DIAGNOSIS — Z13.220 SCREENING FOR CHOLESTEROL LEVEL: ICD-10-CM

## 2023-02-06 DIAGNOSIS — Z00.00 ROUTINE GENERAL MEDICAL EXAMINATION AT A HEALTH CARE FACILITY: Primary | ICD-10-CM

## 2023-02-06 LAB
CHOLEST SERPL-MCNC: 215 MG/DL
HDLC SERPL-MCNC: 74 MG/DL
LDLC SERPL CALC-MCNC: 125 MG/DL
NONHDLC SERPL-MCNC: 141 MG/DL
TRIGL SERPL-MCNC: 79 MG/DL

## 2023-02-06 PROCEDURE — 99396 PREV VISIT EST AGE 40-64: CPT | Performed by: FAMILY MEDICINE

## 2023-02-06 PROCEDURE — 36415 COLL VENOUS BLD VENIPUNCTURE: CPT | Performed by: FAMILY MEDICINE

## 2023-02-06 PROCEDURE — 80061 LIPID PANEL: CPT | Performed by: FAMILY MEDICINE

## 2023-02-06 ASSESSMENT — ENCOUNTER SYMPTOMS
MYALGIAS: 0
ABDOMINAL PAIN: 0
CHILLS: 0
COUGH: 0
WEAKNESS: 0
NERVOUS/ANXIOUS: 0
HEADACHES: 0
SHORTNESS OF BREATH: 0
BREAST MASS: 0
DIZZINESS: 0
JOINT SWELLING: 0
HEARTBURN: 0
ARTHRALGIAS: 0
SORE THROAT: 0
DIARRHEA: 0
DYSURIA: 0
EYE PAIN: 0
PARESTHESIAS: 0
HEMATOCHEZIA: 0
NAUSEA: 0
CONSTIPATION: 0
PALPITATIONS: 0
HEMATURIA: 0
FEVER: 0
FREQUENCY: 0

## 2023-02-06 NOTE — PROGRESS NOTES
SUBJECTIVE:   CC: Laya is an 52 year old who presents for preventive health visit.   Patient has been advised of split billing requirements and indicates understanding: Yes  Healthy Habits:     Getting at least 3 servings of Calcium per day:  Yes    Bi-annual eye exam:  Yes    Dental care twice a year:  Yes    Sleep apnea or symptoms of sleep apnea:  None    Diet:  Gluten-free/reduced and Other    Frequency of exercise:  6-7 days/week    Duration of exercise:  30-45 minutes    Taking medications regularly:  Yes    PHQ-2 Total Score: 0    Additional concerns today:  No    Doing well  Continued issues with her pelvic pain and gets very anxious thinking about it getting worse. Still there every day and is elizal  Gets annual cholesterol at work and was higher this time. Likes butter and eggs     Annual mammograms               Today's PHQ-2 Score:   PHQ-2 (  [employer]) 2023   Q1: Little interest or pleasure in doing things 0   Q2: Feeling down, depressed or hopeless 0   PHQ-2 Score 0   PHQ-2 Total Score (12-17 Years)- Positive if 3 or more points; Administer PHQ-A if positive -   Q1: Little interest or pleasure in doing things Not at all   Q2: Feeling down, depressed or hopeless Not at all   PHQ-2 Score 0       Have you ever done Advance Care Planning? (For example, a Health Directive, POLST, or a discussion with a medical provider or your loved ones about your wishes): No, advance care planning information given to patient to review.  Patient plans to discuss their wishes with loved ones or provider.     Her  is her decision maker     Social History     Tobacco Use     Smoking status: Former     Packs/day: 0.00     Types: Cigarettes     Quit date: 10/4/2004     Years since quittin.3     Smokeless tobacco: Never   Substance Use Topics     Alcohol use: Yes     Comment: 2 drinks once a week         Alcohol Use 2023   Prescreen: >3 drinks/day or >7 drinks/week? No     Very limited alcohol  "use     Reviewed orders with patient.  Reviewed health maintenance and updated orders accordingly - Yes      Breast Cancer Screening:  Any new diagnosis of family breast, ovarian, or bowel cancer? No    FHS-7: No flowsheet data found.    Mammogram Screening: Recommended annual mammography  Pertinent mammograms are reviewed under the imaging tab.    History of abnormal Pap smear:   Last 3 Pap and HPV Results:   PAP / HPV Latest Ref Rng & Units 12/7/2020 7/7/2017 9/28/2015   PAP (Historical) - NIL ASC-US(A) ASC-H(A)   HPV16 NEG:Negative Negative Negative -   HPV18 NEG:Negative Negative Negative -   HRHPV NEG:Negative Negative Negative -       Reviewed and updated as needed this visit by clinical staff   Tobacco  Allergies  Meds  Problems  Med Hx  Surg Hx  Fam Hx              Review of Systems   Constitutional: Negative for chills and fever.   HENT: Positive for congestion. Negative for ear pain, hearing loss and sore throat.    Eyes: Negative for pain and visual disturbance.   Respiratory: Negative for cough and shortness of breath.    Cardiovascular: Negative for chest pain, palpitations and peripheral edema.   Gastrointestinal: Negative for abdominal pain, constipation, diarrhea, heartburn, hematochezia and nausea.   Breasts:  Negative for tenderness, breast mass and discharge.   Genitourinary: Negative for dysuria, frequency, genital sores, hematuria, pelvic pain, urgency, vaginal bleeding and vaginal discharge.   Musculoskeletal: Negative for arthralgias, joint swelling and myalgias.   Skin: Negative for rash.   Neurological: Negative for dizziness, weakness, headaches and paresthesias.   Psychiatric/Behavioral: Negative for mood changes. The patient is not nervous/anxious.           OBJECTIVE:   /80   Pulse 54   Temp 97.8  F (36.6  C) (Oral)   Resp 16   Ht 1.651 m (5' 5\")   Wt 53.5 kg (118 lb)   LMP  (LMP Unknown)   SpO2 100%   BMI 19.64 kg/m    Physical Exam  GENERAL: healthy, alert and no " distress  EYES: Eyes grossly normal to inspection, PERRL and conjunctivae and sclerae normal  HENT: ear canals and TM's normal, nose and mouth without ulcers or lesions  NECK: no adenopathy, no asymmetry, masses, or scars and thyroid normal to palpation  RESP: lungs clear to auscultation - no rales, rhonchi or wheezes  CV: regular rate and rhythm, normal S1 S2, no S3 or S4, no murmur, click or rub, no peripheral edema and peripheral pulses strong  ABDOMEN: soft, nontender, no hepatosplenomegaly, no masses and bowel sounds normal  MS: no gross musculoskeletal defects noted, no edema  SKIN: no suspicious lesions or rashes        ASSESSMENT/PLAN:   Laya was seen today for physical.    Diagnoses and all orders for this visit:    Routine general medical examination at a health care facility - generally up to date. Provided her with the ACP document for her to review with her . Added lipids since she has worked on improving her eating.  Her chronic pain is much improved and her weight is more stable./     Screening for cholesterol level  -     Lipid panel; Future  -     Lipid panel        Patient has been advised of split billing requirements and indicates understanding: Yes      COUNSELING:  Reviewed preventive health counseling, as reflected in patient instructions       Healthy diet/nutrition       Advance Care Planning        She reports that she quit smoking about 18 years ago. She has never used smokeless tobacco.          Barb Lemus MD  [facility]

## 2023-02-14 NOTE — RESULT ENCOUNTER NOTE
Mychart result sent       Silver Nitrate Text: The wound bed was treated with silver nitrate after the biopsy was performed.

## 2023-02-20 DIAGNOSIS — R10.2 PELVIC PAIN IN FEMALE: ICD-10-CM

## 2023-02-20 RX ORDER — CYCLOBENZAPRINE HCL 5 MG
TABLET ORAL
Qty: 60 TABLET | Refills: 3 | Status: SHIPPED | OUTPATIENT
Start: 2023-02-20 | End: 2023-08-28

## 2023-02-20 NOTE — TELEPHONE ENCOUNTER
"Request for medication refill:  cyclobenzaprine (FLEXERIL) 5 MG tablet    Providers if patient needs an appointment and you are willing to give a one month supply please refill for one month and  send a letter/MyChart using \".SMILLIMITEDREFILL\" .smillimited and route chart to \"P [facility] \" (Giving one month refill in non controlled medications is strongly recommended before denial)    If refill has been denied, meaning absolutely no refills without visit, please complete the smart phrase \".smirxrefuse\" and route it to the \"P [facility] MED REFILLS\"  pool to inform the patient and the pharmacy.    Bolivar Bryant MA        "

## 2023-04-12 DIAGNOSIS — R10.2 PELVIC PAIN IN FEMALE: ICD-10-CM

## 2023-04-12 RX ORDER — GABAPENTIN 100 MG/1
CAPSULE ORAL
Qty: 90 CAPSULE | Refills: 1 | Status: SHIPPED | OUTPATIENT
Start: 2023-04-12 | End: 2023-08-28

## 2023-04-12 NOTE — TELEPHONE ENCOUNTER
"Request for medication refill: gabapentin (NEURONTIN) 100 MG capsule    Providers if patient needs an appointment and you are willing to give a one month supply please refill for one month and  send a letter/MyChart using \".SMILLIMITEDREFILL\" .smillimited and route chart to \"P [facility] \" (Giving one month refill in non controlled medications is strongly recommended before denial)    If refill has been denied, meaning absolutely no refills without visit, please complete the smart phrase \".smirxrefuse\" and route it to the \"P SMI MED REFILLS\"  pool to inform the patient and the pharmacy.    Skylar Tobar, [facility]      "

## 2023-05-01 ENCOUNTER — THERAPY VISIT (OUTPATIENT)
Dept: PHYSICAL THERAPY | Facility: CLINIC | Age: 53
End: 2023-05-01
Payer: COMMERCIAL

## 2023-05-01 DIAGNOSIS — M99.05 SOMATIC DYSFUNCTION OF PELVIS REGION: ICD-10-CM

## 2023-05-01 DIAGNOSIS — N30.10 INTERSTITIAL CYSTITIS: Primary | ICD-10-CM

## 2023-05-01 PROCEDURE — 97161 PT EVAL LOW COMPLEX 20 MIN: CPT | Mod: GP | Performed by: PHYSICAL THERAPIST

## 2023-05-01 PROCEDURE — 97535 SELF CARE MNGMENT TRAINING: CPT | Mod: GP | Performed by: PHYSICAL THERAPIST

## 2023-05-01 PROCEDURE — 97530 THERAPEUTIC ACTIVITIES: CPT | Mod: GP | Performed by: PHYSICAL THERAPIST

## 2023-05-01 PROCEDURE — 97110 THERAPEUTIC EXERCISES: CPT | Mod: GP | Performed by: PHYSICAL THERAPIST

## 2023-05-01 NOTE — PROGRESS NOTES
Physical Therapy Initial Evaluation  Subjective:  The history is provided by the patient. No  was used.   Patient Health History  Laya Sainz being seen for Interstitial cystitis and pelvic floor dysfunction.     Problem began: 11/30/2019.   Problem occurred: Its not from an injury, its a chronic condition   Pain is reported as 2/10 on pain scale.  General health as reported by patient is good.     Red flags:  None as reported by patient.  Medical allergies: none.   Surgeries include:  Other. Other surgery history details: Hysto 2012 and 3 nancy surgies for endometriosis [id number].    Current medications:  Anti-inflammatory and muscle relaxants.       Primary job tasks include:  Computer work.                                    Objective:  System    Physical Exam    General     ROS    Assessment/Plan:

## 2023-05-01 NOTE — PROGRESS NOTES
"Physical Therapy Initial Evaluation  Subjective:  Pt comes back to PT (last seen in 2020) with c/o chronic interstitial cystitis. Hx of endometriosis, hysterectomy, small diverticula, appendectomy, and kidney stones. Main symptoms are achy feeling in coccyx with intermittent episodes of sharp, electric pain to the front of pelvis L>R. During these episodes of pain, she will have urinary urgency, frequency, and stress incontinence with high impact exercise. Bending for cleaning/gardening in addition to certain foods/too much alcohol are triggers for pain and leakage. Pt hasn't had intercourse in over 2.5 years for fear of triggering this pain. One indicator that she will have a pain flare up is pain with passing a bowel movement, which she describes as a \"bulging\" and \"full\" sensation in her descending colon. Ibuprofen and muscle relaxants have been effective for reducing pain during a flare up. Pt also rides exercise bike everyday for 5 miles and does hip strengthening exercises, which help prevent pain flare ups. Notices pain is more prominent when she's not moving or exercising. Pt works as , primarily doing desk work.     Urination:  Do you leak on the way to the bathroom or with a strong urge to void? No    Do you leak with cough,sneeze, jumping, running?Yes   Any other activities that cause leaking? No   Do you have triggers that make you feel you can't wait to go to the bathroom? Certain foods  Type of pad and number used per day? NA  When you leak what is the amount? Small    How long can you delay the need to urinate? NA.   How many times do you get up to urinate at night? 0 to 1    Can you stop the flow of urine when on the toilet? Yes  Is the volume of urine passed usually: average. (8sec rule=  250ml with average bladder storing  400-600ml)    Do you strain to pass urine? No  Do you have a slow or hesitant urinary stream? Yes, in morning  Do you have difficulty initiating the urine stream? " No    How many bladder infections have you had in last 12 months? 0    Fluid intake(one glass is 8oz or one cup) 64 water glasses/day, 0 caffinated glasses/day  1 alcohol glasses/day.    Bowel habits:  Frequency of bowel movements? 1 times a day  Consistancy of stool? soft, soft formed  Do you ignore the urge to defecate? No  Do you strain to pass stool? No    Pelvic Pain:  Do you have any pelvic pain with intercourse, exams, use of tampons? Yes, mostly on left groin area and left ovary area  Are you sexually active? Barely  Is initial penetration during intercourse painful? NA  Is deeper penetration painful? NA  Do you use lubricant? NA      Given birth? Yes Any complications? No  # of vaginal delieveries? 2    Have you ever been worried for your physical safety? No   Any abdominal or pelvic surgeries? Yes, 3 laser surgeries for endometriosis and a hysterectomy  Are you having any regular exercise? Yes  Have you practiced the PF(kegel) exercises for 4 or more weeks? Yes  Marinoff Scale: NA  (Level 3: Abstinence from intercourse because of severe pain. Level 2: Painful intercourse which limites frequency of activity. Level 1: Painful intercourse not severe enough to prevent activity.)    The history is provided by the patient. No  was used.   Patient Health History  Laya Sainz being seen for Interstitial cystitis and pelvic floor dysfunction.     Problem began: 11/30/2019.   Problem occurred: Its not from an injury, its a chronic condition   Pain is reported as 2/10 on pain scale.  General health as reported by patient is good.       Medical allergies: none.   Surgeries include:  Other. Other surgery history details: Hysto 2012 and 3 nancy surgies for endometriosis [id number].    Current medications:  Anti-inflammatory and muscle relaxants.       Primary job tasks include:  Computer work.                                    Objective:  System         Lumbar/SI Evaluation  ROM:  AROM Lumbar:  normal                                                Pelvic Dysfunction Evaluation:        Flexibility:    Tightness present at:Adductors and Piriformis      Pelvic Clock Exam:  Pelvic clock exam: L PF muscles more tender than R. OI and LA held most tension and most tender to touch.  Ischiocavernosis pain:  +  Bulbocavernosis pain:  -  Transverse Perineal:  -  Levator ANI:  ++  Perineal Body:  -  SI Provocation:      Negative for:  SI Compression and Fabres        External Assessment:  External assessment pelvic: Bulging of skin/edema over R lateral aspect of coccyx.  Skin Condition:  Normal      Tissue Symmetry:  Normal  Introitus:  Normal  Muscle Contraction/Perineal Mobility:  Elevation and urogential triangle descent  Internal Assessment:  Internal assessment pelvic: PF Contraction: 2/5 strength, 5 second hold. Difficulty fully relaxing.         Accessory Muscle use-Gluteals:  Present  Accessory Muscle use-Adductors:  Present              Hip Evaluation  HIP AROM:    Flexion: Left: WFL    Right:  WFL, but decr compared to L          Internal Rotation: Left: WFL    Right: WFL  External Rotation: Left: WFL    Right: WFL, but decr compared to L        Hip Strength:    Flexion:   Left: 4/5    Pain: strong/pain free  Right: 4/5    Pain: strong/pain free                      Abduction:  Left: 3+/5      Pain:weak/pain freeRight: 4/5     Pain:strong/pain free  Adduction:  Left: /5   Pain:weak/pain free                    Functional Testing:          Quad:    Single leg squat:   Left:    Normal control  Right:   Mild loss of control and femoral IR    Bilateral leg squat: Less WB through R LE, favors L side      Proprioception:    Stork balance test:   Left:    >10 sec no LOB  Right:  >10 sec no LOB  % of Uninvolved:                General     ROS    Assessment/Plan:    Patient is a 52 year old female with pelvic complaints.    Patient has the following significant findings with corresponding treatment plan.                 Diagnosis 1:  Interstitial cystitis  Pain -  hot/cold therapy, US, electric stimulation, manual therapy, self management, education and home program  Decreased ROM/flexibility - manual therapy, therapeutic exercise, therapeutic activity and home program  Decreased strength - therapeutic exercise, therapeutic activities and home program  Impaired muscle performance - biofeedback, electric stimulation, neuro re-education and home program  Decreased function - therapeutic activities and home program    Therapy Evaluation Codes:   1) History comprised of:   Personal factors that impact the plan of care:      Time since onset of symptoms.    Comorbidity factors that impact the plan of care are:      None.     Medications impacting care: Muscle relaxant and Pain.  2) Examination of Body Systems comprised of:   Body structures and functions that impact the plan of care:      Pelvis.   Activity limitations that impact the plan of care are:      Bending, Sports, Squatting/kneeling, Frequency, Pelvic Exam, [place], Stress incontinence and Urgency.  3) Clinical presentation characteristics are:   Stable/Uncomplicated.  4) Decision-Making    Low complexity using standardized patient assessment instrument and/or measureable assessment of functional outcome.  Cumulative Therapy Evaluation is: Low complexity.    Previous and current functional limitations:  (See Goal Flow Sheet for this information)    Short term and Long term goals: (See Goal Flow Sheet for this information)     Communication ability:  Patient appears to be able to clearly communicate and understand verbal and written communication and follow directions correctly.  Treatment Explanation - The following has been discussed with the patient:   RX ordered/plan of care  Anticipated outcomes  Possible risks and side effects  This patient would benefit from PT intervention to resume normal activities.   Rehab potential is fair.    Frequency:  One time evaluation  and treatment.  Duration:  One time evaluation and treatment.  Discharge Plan:  One time evaluation and treatment.    Please refer to the daily flowsheet for treatment today, total treatment time and time spent performing 1:1 timed codes.

## 2023-05-01 NOTE — PROGRESS NOTES
05/01/23 0500   Treating Provider   Treating Provider Mireille Peterson SPT // Leyda Roberts   Pelvic Health Only: Informed Consent   Pelvic Health Informed Consent Statement Discussed with patient/guardian reason for referral regarding pelvic health needs and external/internal pelvic floor muscle examination.  Opportunity provided to ask questions and verbal consent for assessment and intervention was given.   Contact Information   Procedure Code: The timed procedures billed today were performed sequentially within this encounter. Therapeutic Exercise;Self Cares - Home Mgmt;Therapeutic Activities;Other   Minutes: Therapeutic exercise 12   Minutes: Therapeutic activities 13   Minutes: Self Cares - Home mgmt 10   Minutes: Other 30 eval   Rxs Authorized self-referred, [employer]   Rxs Used 1   Diagnosis interstitial cystitis   Insurance Preferred One   DOI 01/20/20  (date of first pelvic PT visit)   Subjective See eval   Objective Squats: favors L LE during double leg squat; less WB through R LE; poor stability with R LE, increased genu valgus/femoral IR on R LE with double and single leg squats. Hip ROM: decr flex/ER in R hip. Hip MMT: 4+/5 glut med R, 3/5 glut med L. Flexibility: tightness in piriformis and adductors bilat. Pelvic Floor Exam: 2/5 PF contraction, 5 sec hold. Compensates with adductors/glutes to initiate PF contraction. Poor relaxation. Tenderness and increased resting muscle tension to OI and LA, L>R.   Other pertinent information One time evaluation and treatment.   PTRX Therapeutic Exercise   Therapeutic Exercise 3 Abdominal Massage   Therapeutic Exercise Notes 3 No notes   Therapeutic Exercise 4 Abdominal Scar Tissue Desensitization   Therapeutic Exercise Notes 4 Reviewed   Therapeutic Exercise 5 Diaphragmatic Breathing   Therapeutic Exercise Notes 5 Reviewed   Therapeutic Exercise 6 Supine Butterfly   Therapeutic Exercise Notes 6 Instructed to perform to help with PF relaxation, especially with  dilator use   Therapeutic Exercise 7 Sidelying Hip Abduction at Wall in Neutral and 45 degrees   Therapeutic Exercise Notes 7 Reviewed   Therapeutic Exercise 14 Side Stepping With Theraband   Therapeutic Exercise Notes 14 Reviewed   Therapeutic Exercise 15 Prone Hip Figure Four   Therapeutic Exercise Notes 15 x2 min hold with R LE   Therapeutic Exercise 16 Sit to Stand - Staggered Stance Left Forward   Therapeutic Exercise Notes 16 2x10 from arm chair, cues to avoid genu valgus   PTRX Neuromuscular Re-education    Neuromuscular Re-education 1 Single Limb Stance Eyes Closed on Pillow   Neuromuscular Re-Education Exercise 1 Reviewed   Self Management - All exercises are part of HEP unless otherwise noted   Urogenital Anatomy/PF Instruction Reviewed PF anatomy with 3D model to explain which muscles to perform MFR   Dilator Training Had discussion about using vaginal dilators to address pain with intercourse. Intimate Jania pamphlet provided.   Other Self Management Had discussion about importance of maintaining physical activity to manage pelvic pain. Encouraged to keep exercising on bike everyday.   PTRX Other   Other Exercise 1 ILU Massage   Other Notes 1 Instructed how to perform   Other Exer/Activities/Educ - All exercises are part of HEP unless otherwise noted   Exercise 1 TA   Description 1 Patient education on continued need to strengthen surrounding hip muscles to support pelvic floor and lumbar/sacral spine.   Exercise 2 TA   Description 2 Pelvic Floor Quieting Techniques Version 1   Exercise 3 TA   Description 3 Pelvic floor mysofascial release. Instructed how to perform, focusing on L OI and LA, using sweeping motion. Encouraged to perform diaphragmatic breathing and stretching beforehand to help w/ muscle relaxation.   Assessment   Adverse reactions None   Plan   Plan   (One time evaluate and treat)

## 2023-05-01 NOTE — PROGRESS NOTES
05/01/23 0700   Pelvic Pain   Previous Functional Level No restrictions;Pain with intercourse   Current Functional Level Not having intercourse due to pain   Performance Level Hasn't had intercourse in over 2.5 yrs   STG Target Performance Decrease pelvic pain to allow usage of dilator   Performance Level Able to keep smallest dilator in for 10 minutes   Rationale painfree intercourse   Due Date 06/30/23   LTG Target Performance Decrease pelvic pain to allow usage of dilator   Performance Level Able to keep dilator that's appropriate size for intercourse w/ partner in for 10 minutes   Rationale painfree intercourse   Due Date 07/30/23

## 2023-05-09 ENCOUNTER — ANCILLARY PROCEDURE (OUTPATIENT)
Dept: GENERAL RADIOLOGY | Facility: CLINIC | Age: 53
End: 2023-05-09
Attending: FAMILY MEDICINE
Payer: COMMERCIAL

## 2023-05-09 ENCOUNTER — OFFICE VISIT (OUTPATIENT)
Dept: FAMILY MEDICINE | Facility: CLINIC | Age: 53
End: 2023-05-09
Payer: COMMERCIAL

## 2023-05-09 VITALS
DIASTOLIC BLOOD PRESSURE: 77 MMHG | TEMPERATURE: 98.5 F | HEIGHT: 65 IN | RESPIRATION RATE: 20 BRPM | WEIGHT: 128.9 LBS | OXYGEN SATURATION: 97 % | SYSTOLIC BLOOD PRESSURE: 120 MMHG | BODY MASS INDEX: 21.48 KG/M2 | HEART RATE: 59 BPM

## 2023-05-09 DIAGNOSIS — M77.9 TENDONITIS: ICD-10-CM

## 2023-05-09 DIAGNOSIS — M79.671 ACUTE PAIN OF RIGHT FOOT: ICD-10-CM

## 2023-05-09 DIAGNOSIS — M79.671 ACUTE PAIN OF RIGHT FOOT: Primary | ICD-10-CM

## 2023-05-09 PROCEDURE — 73630 X-RAY EXAM OF FOOT: CPT | Mod: RT | Performed by: RADIOLOGY

## 2023-05-09 PROCEDURE — 99213 OFFICE O/P EST LOW 20 MIN: CPT | Mod: GC

## 2023-05-09 RX ORDER — NAPROXEN 500 MG/1
500 TABLET ORAL 2 TIMES DAILY WITH MEALS
Qty: 30 TABLET | Refills: 0 | Status: SHIPPED | OUTPATIENT
Start: 2023-05-09

## 2023-05-09 NOTE — PROGRESS NOTES
Assessment & Plan     Acute pain of right foot  Tendonitis  Assessed patient foot with x-ray in clinic, negative for any signs of acute fracture, making stress fracture less likely.  At this time suspect most likely etiology of patient's foot pain to be overuse tendinitis of her metatarsal flexors.  We will have patient complete a 2-week course of twice daily naproxen and initiate conservative techniques for her swelling including Ace bandage wrapping icing and elevation.  Given patient's good control of her interstitial cystitis with her regular exercise and biking, told patient that she could continue with biking though I recommend that she keep it light.  We will have patient follow-up if symptoms worsen or do not improve.  - X-ray rt Foot G/E 3 vws*  - naproxen (NAPROSYN) 500 MG tablet  Dispense: 30 tablet; Refill: 0    Return if symptoms worsen or fail to improve.    SARAH VIGIL DO M [facility] DEVORAH Asif is a 52 year old, presenting for the following health issues:  Musculoskeletal Problem (Pt states that she is here for foot pain/swollen foot. State sthat it started hurting a week ago and became swollen 5 days ago. Hurts to walk in the morning)        5/9/2023     3:51 PM   Additional Questions   Roomed by Cecilia   Accompanied by self     HPI     Foot pain - Has some clogs she wears around the house that have a very high arch. Hurts worse in the morning, better as she walks on it. Has had more cramps in her calves, sometimes pain moving up into anterior shin. Takes ibuprofen for other pain and really helps her foot feel better. Sitting for a long time. Swelling is getting worse. Does a lot of biking (every day) on a stationary bike, helps with her interstitial cystitis.      Review of Systems   Constitutional, HEENT, cardiovascular, pulmonary, gi and gu systems are negative, except as otherwise noted.      Objective    /77 (BP Location: Right arm, Patient  "Position: Sitting, Cuff Size: Adult Regular)   Pulse 59   Temp 98.5  F (36.9  C) (Oral)   Resp 20   Ht 1.646 m (5' 4.8\")   Wt 58.5 kg (128 lb 14.4 oz)   LMP  (LMP Unknown)   SpO2 97%   BMI 21.58 kg/m    Body mass index is 21.58 kg/m .  Physical Exam  Vitals reviewed.   Constitutional:       General: She is not in acute distress.     Appearance: Normal appearance. She is normal weight. She is not ill-appearing.   HENT:      Head: Normocephalic and atraumatic.      Right Ear: External ear normal.      Left Ear: External ear normal.      Nose: Nose normal.   Eyes:      Extraocular Movements: Extraocular movements intact.      Conjunctiva/sclera: Conjunctivae normal.   Cardiovascular:      Rate and Rhythm: Normal rate and regular rhythm.      Pulses: Normal pulses.           Dorsalis pedis pulses are 2+ on the right side.        Posterior tibial pulses are 2+ on the right side.      Heart sounds: Normal heart sounds. No murmur heard.     No friction rub. No gallop.   Pulmonary:      Effort: Pulmonary effort is normal. No respiratory distress.      Breath sounds: Normal breath sounds. No stridor. No wheezing.   Abdominal:      General: Abdomen is flat. Bowel sounds are normal. There is no distension.      Palpations: Abdomen is soft.      Tenderness: There is no abdominal tenderness.   Musculoskeletal:         General: Normal range of motion.      Cervical back: Normal range of motion.      Right foot: Bunion present.   Feet:      Right foot:      Skin integrity: Skin integrity normal. No callus or dry skin.      Toenail Condition: Right toenails are normal.      Left foot:      Skin integrity: Skin integrity normal.      Toenail Condition: Left toenails are normal.      Comments: L foot normal  R foot with increased edema, tenderness most prominent over the third metatarsal head with moderate tenderness over the second and fourth metatarsal heads. Appropriate arch height, no pain on the plantar surface of the " foot or on heel squeeze  Skin:     General: Skin is warm and dry.   Neurological:      General: No focal deficit present.      Mental Status: She is alert and oriented to person, place, and time. Mental status is at baseline.   Psychiatric:         Mood and Affect: Mood normal.         Behavior: Behavior normal.         Thought Content: Thought content normal.         Judgment: Judgment normal.

## 2023-05-09 NOTE — PROGRESS NOTES
Preceptor Attestation:   Patient seen, evaluated and discussed with the resident. I have verified the content of the note, which accurately reflects my assessment of the patient and the plan of care.   Supervising Physician:  Kirsten Luis MD

## 2023-05-11 NOTE — PATIENT INSTRUCTIONS
Patient Education   Here is the plan from today's visit    1. Acute pain of right foot  - X-ray rt Foot G/E 3 vws*; Future  - naproxen (NAPROSYN) 500 MG tablet; Take 1 tablet (500 mg) by mouth 2 times daily (with meals)  Dispense: 30 tablet; Refill: 0    2. Tendonitis  - naproxen (NAPROSYN) 500 MG tablet; Take 1 tablet (500 mg) by mouth 2 times daily (with meals)  Dispense: 30 tablet; Refill: 0    Please call or return to clinic if your symptoms don't go away.    Follow up plan  Return if symptoms worsen or fail to improve.    Thank you for coming to [facility] today.  Lab Testing:  **If you had lab testing today and your results are reassuring or normal they will be mailed to you or sent through [employer] within 7 days.   **If the lab tests need quick action we will call you with the results.  **If you are having labs done on a different day, please call [phone] to schedule at [facility] or [phone] for other [facility]patient Lab locations. Labs do not offer walk-in appointments.  The phone number we will call with results is # [phone] (home) [phone] (work). If this is not the best number please call our clinic and change the number.  Medication Refills:  If you need any refills please call your pharmacy and they will contact us.   If you need to  your refill at a new pharmacy, please contact the new pharmacy directly. The new pharmacy will help you get your medications transferred faster.   Scheduling:  If you have any concerns about today's visit or wish to schedule another appointment please call our office during normal business hours [phone] (8-5:00 M-F). If you can no longer make a scheduled visit, please cancel via [facility] or call us to cancel.   If a referral was made to an [facility] specialty provider and you do not get a call from central scheduling, please refer to directions on your visit summary or call our office during normal business hours for  assistance.   If a Mammogram was ordered for you at the [facility] call [phone] to schedule or change your appointment.  If you had an XRay/CT/Ultrasound/MRI ordered the number is [phone] to schedule or change your radiology appointment.   [facility] has limited ultrasound appointments available on Wednesdays, if you would like your ultrasound at [facility], please call [phone] to schedule.   Medical Concerns:  If you have urgent medical concerns please call [phone] at any time of the day.    SARAH VIGIL, DO

## 2023-06-08 PROBLEM — M99.05 SOMATIC DYSFUNCTION OF PELVIS REGION: Status: RESOLVED | Noted: 2023-05-01 | Resolved: 2023-06-08

## 2023-06-23 ENCOUNTER — TELEPHONE (OUTPATIENT)
Dept: PHYSICAL THERAPY | Facility: CLINIC | Age: 53
End: 2023-06-23
Payer: COMMERCIAL

## 2023-06-25 NOTE — TELEPHONE ENCOUNTER
WILLIAN Asif,    I understand you reached out about an exercise for our last visit in May that I forgot to put in with the other exercises on PTRX.  I looked through your chart and I am trying to remember what that was.  If you can please give me an idea, that would be helpful.  Thank you!    Leyda

## 2023-07-25 NOTE — PROGRESS NOTES
Assessment:        ICD-10-CM    1. Hallux valgus of right foot  M20.11       2. Arthritis of midfoot  M19.079       3. Hammertoe of right foot  M20.41                    Plan:      Midfoot arthritis - flared previously.  Return if this recurrs    Bunion  not painful, no need to Tx         Standing foot / ankle x-rays if symptoms not improved        Yoli Hernandez DPM                              Chief Complaint:     Patient presents with:  Right Foot - Bunion       HPI:  Laya Sainz is a 53 year old year old female who presents for:    Had instance where top of L foot became swollen on top and very painful to do stairs - not currently.    Bunion  and 4th toe hurts in heels        Past Medical & Surgical History:  No past medical history on file.   Past Surgical History:   Procedure Laterality Date    ABDOMEN SURGERY  1999 & 2006    lap laser for endometriosis    COLONOSCOPY  1/11/2016    Dr. [facility]    COLONOSCOPY N/A 1/11/2016    Procedure: COLONOSCOPY;  Surgeon: Mona Russell MD;  Location:  GI    COLONOSCOPY N/A 3/29/2022    Procedure: COLONOSCOPY;  Surgeon: Mona Russell MD;  Location:  GI    ENT SURGERY  1972    T&A as a child    GYN SURGERY  2009    hysterectomy    HEAD & NECK SURGERY  1990    wisdom teeth      Family History   Problem Relation Age of Onset    Thyroid Disease Mother     Asthma Father     Thyroid Disease Daughter     Sjogren's Daughter     Lupus Daughter     Diabetes No family hx of     Coronary Artery Disease No family hx of     Hypertension No family hx of     Hyperlipidemia No family hx of     Cerebrovascular Disease No family hx of     Breast Cancer No family hx of     Prostate Cancer No family hx of     Colon Cancer No family hx of     Other Cancer No family hx of     Depression No family hx of     Anxiety Disorder No family hx of     Mental Illness No family hx of     Substance Abuse No family hx of     Anesthesia Reaction No family hx of     Osteoporosis No  family hx of     Genetic Disorder No family hx of     Obesity No family hx of     Unknown/Adopted No family hx of         Social History:  ?  History   Smoking Status    Former    Packs/day: 0.00    Types: Cigarettes    Quit date: 10/4/2004   Smokeless Tobacco    Never     History   Drug Use No     Social History    Substance and Sexual Activity      Alcohol use: Yes        Comment: 2 drinks once a week      Allergies:  ?   Allergies   Allergen Reactions    Seasonal Allergies         Medications:    Current Outpatient Medications   Medication    cetirizine (ZYRTEC) 10 MG tablet    cyclobenzaprine (FLEXERIL) 5 MG tablet    fexofenadine (ALLEGRA) 180 MG tablet    gabapentin (NEURONTIN) 100 MG capsule    ibuprofen (ADVIL/MOTRIN) 200 MG capsule    magnesium oxide (MAG-OX) 400 MG tablet    naproxen (NAPROSYN) 500 MG tablet    Probiotic, Lactobacillus, CAPS    pseudoePHEDrine (SUDAFED) 120 MG 12 hr tablet     No current facility-administered medications for this visit.       Physical Exam:    Vitals:  [unfilled]  General:  WD/WN, in NAD.  A&O x3.  Dermatologic:  Skin is intact, open lesions absent.   Skin texture, turgor is normal.  Vascular:  Pulses palpable.  Digital capillary refill time normal and delayed.  Skin temperature is normal.  Generalized edema- none .  Focal edema- trace dorsal midfoot R vs L  Neurologic:    Gross sensation normal.  Gait and balance normal.  Musculoskeletal:  No pain to palpation foot R  1st MTP ROM not painful, R  2nd TMTJ ROM mildly painful. R  aROM digits, ankle intact.  Muscle strength intact to foot & ankle.  Deformity present- hallux valgus R, limitus L       Imaging  x-ray independently reviewed and interpreted by myself today.  NSeight-bearing views left foot dated 2023, reveal moderate hallux valgus, 2nd TMTJ degeneration.

## 2023-07-28 ENCOUNTER — OFFICE VISIT (OUTPATIENT)
Dept: PODIATRY | Facility: CLINIC | Age: 53
End: 2023-07-28
Payer: COMMERCIAL

## 2023-07-28 VITALS — BODY MASS INDEX: 21.43 KG/M2 | WEIGHT: 128 LBS | HEART RATE: 64 BPM | OXYGEN SATURATION: 98 %

## 2023-07-28 DIAGNOSIS — M20.41 HAMMERTOE OF RIGHT FOOT: ICD-10-CM

## 2023-07-28 DIAGNOSIS — M20.11 HALLUX VALGUS OF RIGHT FOOT: Primary | ICD-10-CM

## 2023-07-28 DIAGNOSIS — M19.079 ARTHRITIS OF MIDFOOT: ICD-10-CM

## 2023-07-28 PROCEDURE — 99203 OFFICE O/P NEW LOW 30 MIN: CPT | Performed by: PODIATRIST

## 2023-07-28 NOTE — LETTER
7/28/2023         RE: Laya Sainz  [address]  [facility] [address]        Dear Colleague,    Thank you for referring your patient, Laya Sainz, to the [facility]. Please see a copy of my visit note below.    Assessment:        ICD-10-CM    1. Hallux valgus of right foot  M20.11       2. Arthritis of midfoot  M19.079       3. Hammertoe of right foot  M20.41                    Plan:      Midfoot arthritis - flared previously.  Return if this recurrs    Bunion  not painful, no need to Tx         Standing foot / ankle x-rays if symptoms not improved        Yoli Hernandez DPM                              Chief Complaint:     Patient presents with:  Right Foot - Bunion       HPI:  Laya Sainz is a 53 year old year old female who presents for:    Had instance where top of L foot became swollen on top and very painful to do stairs - not currently.    Bunion  and 4th toe hurts in heels        Past Medical & Surgical History:  No past medical history on file.   Past Surgical History:   Procedure Laterality Date     ABDOMEN SURGERY  1999 & 2006    lap laser for endometriosis     COLONOSCOPY  1/11/2016    Dr. Russell [facility]     COLONOSCOPY N/A 1/11/2016    Procedure: COLONOSCOPY;  Surgeon: Mona Russell MD;  Location:  GI     COLONOSCOPY N/A 3/29/2022    Procedure: COLONOSCOPY;  Surgeon: Mona Russell MD;  Location:  GI     ENT SURGERY  1972    T&A as a child     GYN SURGERY  2009    hysterectomy     HEAD & NECK SURGERY  1990    wisdom teeth      Family History   Problem Relation Age of Onset     Thyroid Disease Mother      Asthma Father      Thyroid Disease Daughter      Sjogren's Daughter      Lupus Daughter      Diabetes No family hx of      Coronary Artery Disease No family hx of      Hypertension No family hx of      Hyperlipidemia No family hx of      Cerebrovascular Disease No family hx of      Breast Cancer No family hx of      Prostate Cancer No family hx  of      Colon Cancer No family hx of      Other Cancer No family hx of      Depression No family hx of      Anxiety Disorder No family hx of      Mental Illness No family hx of      Substance Abuse No family hx of      Anesthesia Reaction No family hx of      Osteoporosis No family hx of      Genetic Disorder No family hx of      Obesity No family hx of      Unknown/Adopted No family hx of         Social History:  ?  History   Smoking Status     Former     Packs/day: 0.00     Types: Cigarettes     Quit date: 10/4/2004   Smokeless Tobacco     Never     History   Drug Use No     Social History    Substance and Sexual Activity      Alcohol use: Yes        Comment: 2 drinks once a week      Allergies:  ?   Allergies   Allergen Reactions     Seasonal Allergies         Medications:    Current Outpatient Medications   Medication     cetirizine (ZYRTEC) 10 MG tablet     cyclobenzaprine (FLEXERIL) 5 MG tablet     fexofenadine (ALLEGRA) 180 MG tablet     gabapentin (NEURONTIN) 100 MG capsule     ibuprofen (ADVIL/MOTRIN) 200 MG capsule     magnesium oxide (MAG-OX) 400 MG tablet     naproxen (NAPROSYN) 500 MG tablet     Probiotic, Lactobacillus, CAPS     pseudoePHEDrine (SUDAFED) 120 MG 12 hr tablet     No current facility-administered medications for this visit.       Physical Exam:    Vitals:  [unfilled]  General:  WD/WN, in NAD.  A&O x3.  Dermatologic:  Skin is intact, open lesions absent.   Skin texture, turgor is normal.  Vascular:  Pulses palpable.  Digital capillary refill time normal and delayed.  Skin temperature is normal.  Generalized edema- none .  Focal edema- trace dorsal midfoot R vs L  Neurologic:    Gross sensation normal.  Gait and balance normal.  Musculoskeletal:  No pain to palpation foot R  1st MTP ROM not painful, R  2nd TMTJ ROM mildly painful. R  aROM digits, ankle intact.  Muscle strength intact to foot & ankle.  Deformity present- hallux valgus R, limitus L       Imaging  x-ray independently reviewed and  interpreted by myself today.  NSeight-bearing views left foot dated 2023, reveal moderate hallux valgus, 2nd TMTJ degeneration.      Again, thank you for allowing me to participate in the care of your patient.        Sincerely,        Yoli Hernandez DPM

## 2023-07-28 NOTE — PATIENT INSTRUCTIONS
"PATIENT INSTRUCTIONS - Podiatry / Foot & Ankle Surgery      Not painful - no intervention needed        Midfoot arthritis - causing top of foot swelling on R    Only need to treat if causing pain                Toe Spacers:  Flexible bunions without arthritis can benefit from multiple toe spacers, worn to align the toes and strengthen the small muscles in the feet, which may improve the bunion.  Effects can be seen after months to 1+ years of consistent use, in addition to performing \"toe yoga\" (see below).  They are intended for active individuals in shoes with a foot shaped toe box and toe socks worn underneath.    -Multiple toe spacers: https://www.correcttoes.com, https://naturalfootgear.[employer]    Toe socks:   -Worn underneath spacers  -Help prevent blisters when worn in shoes with a wide toe box (with or without toe spacers)  -https://www.injinji.com  -https://www.toesox.[employer].  -https://naturalfootgear.[employer]    Shoes with a wide toe box:  - Foot shaped  - widest at the tips of the toes (non-tapering)  -Allow the toes to spread/splay as they naturally should  -Promote strengthening the small intrinsic muscles in the foot that balance the toes & support the arch  -Accommodate toe spacers   With arch support & cushioning:  Birkenstock, Crocs, Keen,    With cushioning:  Altra, Marc, Lems   With minimal cushioning:  Altra, Marc, Vivobarefoot, Lems    \"Toe yoga\":    Intrinsic foot muscle strengthening. For personal instruction on this,  please request a Physical Therapy referral from your doctor.    Surgical Procedures:  -Lapidus bunionectomy - 6 weeks no weight on foot, then 2-4 weeks weight bearing in boot   -Return to impact activity at 3+ months post-operatively          "

## 2023-08-28 ENCOUNTER — MYC MEDICAL ADVICE (OUTPATIENT)
Dept: FAMILY MEDICINE | Facility: CLINIC | Age: 53
End: 2023-08-28
Payer: COMMERCIAL

## 2023-08-28 DIAGNOSIS — R10.2 PELVIC PAIN IN FEMALE: ICD-10-CM

## 2023-08-28 RX ORDER — CYCLOBENZAPRINE HCL 5 MG
5 TABLET ORAL 3 TIMES DAILY PRN
Qty: 60 TABLET | Refills: 3 | Status: SHIPPED | OUTPATIENT
Start: 2023-08-28

## 2023-08-28 RX ORDER — GABAPENTIN 100 MG/1
CAPSULE ORAL
Qty: 90 CAPSULE | Refills: 1 | Status: SHIPPED | OUTPATIENT
Start: 2023-08-28 | End: 2024-01-09

## 2023-08-28 NOTE — TELEPHONE ENCOUNTER
"Last seen 5/9/23    Request for medication refill:  cyclobenzaprine (FLEXERIL) 5 MG tablet   gabapentin (NEURONTIN) 100 MG capsule     Providers if patient needs an appointment and you are willing to give a one month supply please refill for one month and  send a letter/MyChart using \".SMILLIMITEDREFILL\" .smillimited and route chart to \"P SMI \" (Giving one month refill in non controlled medications is strongly recommended before denial)    If refill has been denied, meaning absolutely no refills without visit, please complete the smart phrase \".smirxrefuse\" and route it to the \"P SMI MED REFILLS\"  pool to inform the patient and the pharmacy.    Roxana Travis RN      "

## 2023-10-26 NOTE — TELEPHONE ENCOUNTER
Message routed to PCP to review and advise.    Malorie Chambers RN    
Sent Mychart to recommend visit.   
[facility] phone call message - order or referral request for patient:     Order or referral being requested: Xray      Additional Comments: Pt would like to do Xray after her MRI result. Please advise.     OK to leave a message on voice mail? Yes    Primary language: English      needed? No    Call taken on September 12, 2017 at 9:21 AM by Tori Sanchez      
logrolling technique/verbal cues/nonverbal cues (demo/gestures)/1 person assist

## 2024-01-09 DIAGNOSIS — R10.2 PELVIC PAIN IN FEMALE: ICD-10-CM

## 2024-01-09 NOTE — TELEPHONE ENCOUNTER
"Request for medication refill:  gabapentin (NEURONTIN) 100 MG capsule     Providers if patient needs an appointment and you are willing to give a one month supply please refill for one month and  send a letter/MyChart using \".SMILLIMITEDREFILL\" .smillimited and route chart to \"P SMI \" (Giving one month refill in non controlled medications is strongly recommended before denial)    If refill has been denied, meaning absolutely no refills without visit, please complete the smart phrase \".smirxrefuse\" and route it to the \"P SMI MED REFILLS\"  pool to inform the patient and the pharmacy.    Xu Newton, CMA      "

## 2024-01-10 RX ORDER — GABAPENTIN 100 MG/1
CAPSULE ORAL
Qty: 90 CAPSULE | Refills: 1 | Status: SHIPPED | OUTPATIENT
Start: 2024-01-10 | End: 2024-03-18

## 2024-03-17 ENCOUNTER — HEALTH MAINTENANCE LETTER (OUTPATIENT)
Age: 54
End: 2024-03-17

## 2024-03-18 DIAGNOSIS — R10.2 PELVIC PAIN IN FEMALE: ICD-10-CM

## 2024-03-18 RX ORDER — GABAPENTIN 100 MG/1
CAPSULE ORAL
Qty: 90 CAPSULE | Refills: 1 | Status: SHIPPED | OUTPATIENT
Start: 2024-03-18

## 2024-04-19 ENCOUNTER — OFFICE VISIT (OUTPATIENT)
Dept: DERMATOLOGY | Facility: CLINIC | Age: 54
End: 2024-04-19
Payer: COMMERCIAL

## 2024-04-19 DIAGNOSIS — D18.01 CHERRY ANGIOMA: ICD-10-CM

## 2024-04-19 DIAGNOSIS — L82.1 SEBORRHEIC KERATOSES: ICD-10-CM

## 2024-04-19 DIAGNOSIS — Z12.83 ENCOUNTER FOR SCREENING FOR MALIGNANT NEOPLASM OF SKIN: Primary | ICD-10-CM

## 2024-04-19 DIAGNOSIS — L73.8 SENILE SEBACEOUS GLAND HYPERPLASIA: ICD-10-CM

## 2024-04-19 DIAGNOSIS — L81.4 LENTIGINES: ICD-10-CM

## 2024-04-19 DIAGNOSIS — D22.9 MULTIPLE NEVI: ICD-10-CM

## 2024-04-19 PROCEDURE — 99204 OFFICE O/P NEW MOD 45 MIN: CPT | Performed by: PHYSICIAN ASSISTANT

## 2024-04-19 RX ORDER — TRETINOIN 0.5 MG/G
CREAM TOPICAL AT BEDTIME
Qty: 45 G | Refills: 4 | Status: SHIPPED | OUTPATIENT
Start: 2024-04-19

## 2024-04-19 ASSESSMENT — PAIN SCALES - GENERAL: PAINLEVEL: NO PAIN (0)

## 2024-04-19 NOTE — LETTER
"4/19/2024       RE: Laya Sainz  [address]  [facility] [address]     Dear Colleague,    Thank you for referring your patient, Laya Sainz, to the [facility] [place] at [facility]. Please see a copy of my visit note below.    [facility] Dermatology Note  Encounter Date: Apr 19, 2024  Office Visit     Reviewed patients past medical history and pertinent chart review prior to patients visit today.     Dermatology Problem List:  # Sebaceous hyperplasia  - Tretinoin 0.05% cream  # Lesion to monitor, left chest  - photo 4/19/2024     ____________________________________________    Assessment & Plan:     # Sebaceous hyperplasia  - Tretinoin [Retin A] 0.05% cream was prescribed today. A \"pea\" sized amount should be applied to the entire face nightly. Initially, the cream should be used every 3rd night for two weeks, then every other night for two weeks, then nightly as tolerated to limit irritation. Moisturization after application of the tretinoin cream will help with discomfort should irritation develop.    # Lesion to monitor, left chest  # Multiple nevi, trunk and extremities  # Solar lentigines  - No concerning features on dermoscopy. We discussed the importance of self exams at home.   - ABCDEs: Counseled ABCDEs of melanoma: Asymmetry, Border (irregularity), Color (not uniform, changes in color), Diameter (greater than 6 mm which is about the size of a pencil eraser), and Evolving (any changes in preexisting moles).  - Sun protection: Counseled SPF 30+ sunscreen, UPF clothing, sun avoidance, tanning bed avoidance.    # Scaly patch, let knee  - Restart triamcinolone twice daily for 2-4 weeks. If the lesion does not resolve follow up for re-evaluation.     # Cherry angiomas  # Seborrheic keratoses  - We discussed the benign nature of the skin lesions. No treatment required. Continued observation recommended. " Follow up with any concerns.      Follow-up:  Annual for follow up full body skin exam, as needed for new or changing lesions or new concerns    All risks, benefits and alternatives were discussed with patient.  Patient is in agreement and understands the assessment and plan.  All questions were answered.  Kaela Beremo PA-C  [facility] Dermatology    ____________________________________________    CC: Skin Check (FBSE.  Spots of concern on the right side of the neck and cheeks)    HPI:  Ms. Laya Sainz is a(n) 53 year old female who presents today as a new patient for a full body skin cancer screening. The patient requests evaluation of a lesion on the right cheek that releases material, then comes back. She reports responding well to tretinoin in the past. Additionally, she notes a history of an eczema patch on the left knee. This is minimally bothersome. She has triamcinolone at home, but has not used this recently. No other specific cutaneous concerns today. The patient reports trying to be diligent with photoprotection.      Physical Exam:  Vitals: LMP  (LMP Unknown)   SKIN: Total skin excluding the genitalia areas was performed. The exam included the scalp, face, neck, bilateral arms, chest, back, abdomen, bilateral legs, digits, mons pubis, buttocks, and nails.   - Stephens II.  - Scattered on the face are yellow papules with coronal telangiectasias, consistent with sebaceous hyperplasia.   - The left chest demonstrates a 0.4 x 0.4 cm tan macule with two brown pigment globules with reticular network in dermoscopy.   - Pink patch with thin scale involving the left anterior knee.   - Multiple tan/brown macules and papules scattered throughout exam, consistent with benign nevi. No concerning features on dermoscopy.   - Scattered tan, homogenous macules scattered on sun exposed skin, consistent with solar lentigines.   - Scattered waxy, stuck on appearing papules and patches, consistent with  seborrheic keratoses.    - Several 1-2 mm red dome shaped symmetric papules, consistent with cherry angiomas.               Medications:  Current Outpatient Medications   Medication Sig Dispense Refill    cetirizine (ZYRTEC) 10 MG tablet Take 10 mg by mouth daily      cyclobenzaprine (FLEXERIL) 5 MG tablet Take 1 tablet (5 mg) by mouth 3 times daily as needed for muscle spasms 60 tablet 3    fexofenadine (ALLEGRA) 180 MG tablet Take 180 mg by mouth as needed      gabapentin (NEURONTIN) 100 MG capsule TAKE UP TO 2 CAPSULES BY MOUTH A DAY AS NEEDED AND 1 CAPSULE AT BEDTIME 90 capsule 1    ibuprofen (ADVIL/MOTRIN) 200 MG capsule Take 200 mg by mouth every 4 hours as needed for fever      magnesium oxide (MAG-OX) 400 MG tablet Take 1 tablet by mouth daily      naproxen (NAPROSYN) 500 MG tablet Take 1 tablet (500 mg) by mouth 2 times daily (with meals) 30 tablet 0    Probiotic, Lactobacillus, CAPS Take 1 capsule by mouth      pseudoePHEDrine (SUDAFED) 120 MG 12 hr tablet Take 120 mg by mouth as needed       No current facility-administered medications for this visit.      Past Medical History:   Patient Active Problem List   Diagnosis    Endometriosis    Recurrent UTI    Abnormal Pap smear of cervix    Diverticulosis of large intestine without hemorrhage    Interstitial cystitis    Irritable bowel syndrome with both constipation and diarrhea    H/O abdominal supracervical subtotal hysterectomy    Vasovagal syncope    Palpitations     No past medical history on file.    CC Referred Self, MD  No address on file on close of this encounter.

## 2024-04-19 NOTE — PATIENT INSTRUCTIONS
Patient Education        Proper skin care from [facility]:     -Eliminate harsh soaps as they strip the natural oils from the skin, often resulting in dry itchy skin ( i.e. Dial, Zest, Syriac Spring)  -Use mild soaps such as Cetaphil or Dove Sensitive Skin in the shower. You do not need to use soap on arms, legs, and trunk every time you shower unless visibly soiled.   -Avoid hot or cold showers.  -After showering, lightly dry off and apply moisturizing within 2-3 minutes. This will help trap moisture in the skin.   -Aggressive use of a moisturizer at least 1-2 times a day to the entire body (including -Vanicream, Cetaphil, Aquaphor or Cerave) and moisturize hands after every washing.  -We recommend using moisturizers that come in a tub that needs to be scooped out, not a pump. This has more of an oil base. It will hold moisture in your skin much better than a water base moisturizer. The above recommended are non-pore clogging.        Wear a sunscreen with at least SPF 30 on your face, ears, neck and V of the chest daily. Wear sunscreen on other areas of the body if those areas are exposed to the sun throughout the day. Sunscreens can contain physical and/or chemical blockers. Physical blockers are less likely to clog pores, these include zinc oxide and titanium dioxide. Reapply every two hour and after swimming.      Sunscreen examples: https://www.ewg.org/sunscreen/     UV radiation  UVA radiation remains constant throughout the day and throughout the year. It is a longer wavelength than UVB and therefore penetrates deeper into the skin leading to immediate and delayed tanning, photoaging, and skin cancer. 70-80% of UVA and UVB radiation occurs between the hours of 10am-2pm.  UVB radiation  UVB radiation causes the most harmful effects and is more significant during the summer months. However, snow and ice can reflect UVB radiation leading to skin damage during the winter months as well. UVB radiation is  responsible for tanning, burning, inflammation, delayed erythema (pinkness), pigmentation (brown spots), and skin cancer.      I recommend self monthly full body exams and yearly full body exams with a dermatology provider. If you develop a new or changing lesion please follow up for examination. Most skin cancers are pink and scaly or pink and pearly. However, we do see blue/brown/black skin cancers.  Consider the ABCDEs of melanoma when giving yourself your monthly full body exam ( don't forget the groin, buttocks, feet, toes, etc). A-asymmetry, B-borders, C-color, D-diameter, E-elevation or evolving. If you see any of these changes please follow up in clinic. If you cannot see your back I recommend purchasing a hand held mirror to use with a larger wall mirror.       Checking for Skin Cancer  You can find cancer early by checking your skin each month. There are 3 kinds of skin cancer. They are melanoma, basal cell carcinoma, and squamous cell carcinoma. Doing monthly skin checks is the best way to find new marks or skin changes. Follow the instructions below for checking your skin.   The ABCDEs of checking moles for melanoma   Check your moles or growths for signs of melanoma using ABCDE:   Asymmetry: the sides of the mole or growth don t match  Border: the edges are ragged, notched, or blurred  Color: the color within the mole or growth varies  Diameter: the mole or growth is larger than 6 mm (size of a pencil eraser)  Evolving: the size, shape, or color of the mole or growth is changing (evolving is not shown in the images below)    Checking for other types of skin cancer  Basal cell carcinoma or squamous cell carcinoma have symptoms such as:      A spot or mole that looks different from all other marks on your skin  Changes in how an area feels, such as itching, tenderness, or pain  Changes in the skin's surface, such as oozing, bleeding, or scaliness  A sore that does not heal  New swelling or redness beyond  the border of a mole     Who s at risk?  Anyone can get skin cancer. But you are at greater risk if you have:   Fair skin, light-colored hair, or light-colored eyes  Many moles or abnormal moles on your skin  A history of sunburns from sunlight or tanning beds  A family history of skin cancer  A history of exposure to radiation or chemicals  A weakened immune system  If you have had skin cancer in the past, you are at risk for recurring skin cancer.   How to check your skin  Do your monthly skin checkups in front of a full-length mirror. Check all parts of your body, including your:   Head (ears, face, neck, and scalp)  Torso (front, back, and sides)  Arms (tops, undersides, upper, and lower armpits)  Hands (palms, backs, and fingers, including under the nails)  Buttocks and genitals  Legs (front, back, and sides)  Feet (tops, soles, toes, including under the nails, and between toes)  If you have a lot of moles, take digital photos of them each month. Make sure to take photos both up close and from a distance. These can help you see if any moles change over time.   Most skin changes are not cancer. But if you see any changes in your skin, call your doctor right away. Only he or she can diagnose a problem. If you have skin cancer, seeing your doctor can be the first step toward getting the treatment that could save your life.   [employer] last reviewed this educational content on 4/1/2019 2000-2020 [employer]. [address] [place] [address], [place] [address]. All rights reserved. This information is not intended as a substitute for professional medical care. Always follow your healthcare professional's instructions.        When should I call my doctor?  If you are worsening or not improving, please, contact us or seek urgent care as noted below.      Who should I call with questions (adults)?  [facility] (adult and pediatric): [phone]  [facility]  [place] (adult): [phone]  [facility] ([place], [place] and [place]) [phone]  For urgent needs outside of business hours call the [facility] at [phone] and ask for the dermatology resident on call to be paged  If this is a medical emergency and you are unable to reach an ER, Call 911        If you need a prescription refill, please contact your pharmacy. Refills are approved or denied by our Physicians during normal business hours, Monday through Fridays  Per office policy, refills will not be granted if you have not been seen within the past year (or sooner depending on your child's condition)

## 2024-04-19 NOTE — NURSING NOTE
Dermatology Rooming Note    Laya Sainz's goals for this visit include:   Chief Complaint   Patient presents with    Skin Check     FBSE.  Spots of concern on the right side of the neck and cheeks     Johanne Painter CMA

## 2024-04-19 NOTE — PROGRESS NOTES
"[facility] Dermatology Note  Encounter Date: Apr 19, 2024  Office Visit     Reviewed patients past medical history and pertinent chart review prior to patients visit today.     Dermatology Problem List:  # Sebaceous hyperplasia  - Tretinoin 0.05% cream  # Lesion to monitor, left chest  - photo 4/19/2024     ____________________________________________    Assessment & Plan:     # Sebaceous hyperplasia  - Tretinoin [Retin A] 0.05% cream was prescribed today. A \"pea\" sized amount should be applied to the entire face nightly. Initially, the cream should be used every 3rd night for two weeks, then every other night for two weeks, then nightly as tolerated to limit irritation. Moisturization after application of the tretinoin cream will help with discomfort should irritation develop.    # Lesion to monitor, left chest  # Multiple nevi, trunk and extremities  # Solar lentigines  - No concerning features on dermoscopy. We discussed the importance of self exams at home.   - ABCDEs: Counseled ABCDEs of melanoma: Asymmetry, Border (irregularity), Color (not uniform, changes in color), Diameter (greater than 6 mm which is about the size of a pencil eraser), and Evolving (any changes in preexisting moles).  - Sun protection: Counseled SPF 30+ sunscreen, UPF clothing, sun avoidance, tanning bed avoidance.    # Scaly patch, let knee  - Restart triamcinolone twice daily for 2-4 weeks. If the lesion does not resolve follow up for re-evaluation.     # Cherry angiomas  # Seborrheic keratoses  - We discussed the benign nature of the skin lesions. No treatment required. Continued observation recommended. Follow up with any concerns.      Follow-up:  Annual for follow up full body skin exam, as needed for new or changing lesions or new concerns    All risks, benefits and alternatives were discussed with patient.  Patient is in agreement and understands the assessment and plan.  All questions were answered.  Kaela " ISMAEL Bermeo  [facility] Dermatology    ____________________________________________    CC: Skin Check (FBSE.  Spots of concern on the right side of the neck and cheeks)    HPI:  Ms. Laya Sainz is a(n) 53 year old female who presents today as a new patient for a full body skin cancer screening. The patient requests evaluation of a lesion on the right cheek that releases material, then comes back. She reports responding well to tretinoin in the past. Additionally, she notes a history of an eczema patch on the left knee. This is minimally bothersome. She has triamcinolone at home, but has not used this recently. No other specific cutaneous concerns today. The patient reports trying to be diligent with photoprotection.      Physical Exam:  Vitals: LMP  (LMP Unknown)   SKIN: Total skin excluding the genitalia areas was performed. The exam included the scalp, face, neck, bilateral arms, chest, back, abdomen, bilateral legs, digits, mons pubis, buttocks, and nails.   - Stephens II.  - Scattered on the face are yellow papules with coronal telangiectasias, consistent with sebaceous hyperplasia.   - The left chest demonstrates a 0.4 x 0.4 cm tan macule with two brown pigment globules with reticular network in dermoscopy.   - Pink patch with thin scale involving the left anterior knee.   - Multiple tan/brown macules and papules scattered throughout exam, consistent with benign nevi. No concerning features on dermoscopy.   - Scattered tan, homogenous macules scattered on sun exposed skin, consistent with solar lentigines.   - Scattered waxy, stuck on appearing papules and patches, consistent with seborrheic keratoses.    - Several 1-2 mm red dome shaped symmetric papules, consistent with cherry angiomas.               Medications:  Current Outpatient Medications   Medication Sig Dispense Refill    cetirizine (ZYRTEC) 10 MG tablet Take 10 mg by mouth daily      cyclobenzaprine (FLEXERIL) 5 MG tablet Take 1  tablet (5 mg) by mouth 3 times daily as needed for muscle spasms 60 tablet 3    fexofenadine (ALLEGRA) 180 MG tablet Take 180 mg by mouth as needed      gabapentin (NEURONTIN) 100 MG capsule TAKE UP TO 2 CAPSULES BY MOUTH A DAY AS NEEDED AND 1 CAPSULE AT BEDTIME 90 capsule 1    ibuprofen (ADVIL/MOTRIN) 200 MG capsule Take 200 mg by mouth every 4 hours as needed for fever      magnesium oxide (MAG-OX) 400 MG tablet Take 1 tablet by mouth daily      naproxen (NAPROSYN) 500 MG tablet Take 1 tablet (500 mg) by mouth 2 times daily (with meals) 30 tablet 0    Probiotic, Lactobacillus, CAPS Take 1 capsule by mouth      pseudoePHEDrine (SUDAFED) 120 MG 12 hr tablet Take 120 mg by mouth as needed       No current facility-administered medications for this visit.      Past Medical History:   Patient Active Problem List   Diagnosis    Endometriosis    Recurrent UTI    Abnormal Pap smear of cervix    Diverticulosis of large intestine without hemorrhage    Interstitial cystitis    Irritable bowel syndrome with both constipation and diarrhea    H/O abdominal supracervical subtotal hysterectomy    Vasovagal syncope    Palpitations     No past medical history on file.    CC Referred Self, MD  No address on file on close of this encounter.

## 2024-06-27 ENCOUNTER — TELEPHONE (OUTPATIENT)
Dept: DERMATOLOGY | Facility: CLINIC | Age: 54
End: 2024-06-27
Payer: COMMERCIAL

## 2024-06-27 DIAGNOSIS — L73.8 SEBACEOUS HYPERPLASIA: Primary | ICD-10-CM

## 2024-06-27 NOTE — TELEPHONE ENCOUNTER
[facility] Call Center    Phone Message    May a detailed message be left on voicemail: yes     Reason for Call: Medication Question or concern regarding medication   Prescription Clarification  Name of Medication: tretinoin (RETIN-A) 0.05 % external cream   Prescribing Provider: Kaela Bermeo PA-C    Pharmacy: [facility] [id number]  P: [phone]   What on the order needs clarification? Pt states she would like a stronger dose. Please call Pt to discuss/confirm. Thank you.       Action Taken: Message routed to:  [facility] Derm    Travel Screening: Not Applicable     Date of Service:

## 2024-06-28 RX ORDER — TRETINOIN 1 MG/G
CREAM TOPICAL AT BEDTIME
Qty: 45 G | Refills: 3 | Status: SHIPPED | OUTPATIENT
Start: 2024-06-28

## 2024-07-18 ENCOUNTER — TRANSFERRED RECORDS (OUTPATIENT)
Dept: HEALTH INFORMATION MANAGEMENT | Facility: CLINIC | Age: 54
End: 2024-07-18

## 2024-12-17 SDOH — HEALTH STABILITY: PHYSICAL HEALTH: ON AVERAGE, HOW MANY DAYS PER WEEK DO YOU ENGAGE IN MODERATE TO STRENUOUS EXERCISE (LIKE A BRISK WALK)?: 6 DAYS

## 2024-12-17 SDOH — HEALTH STABILITY: PHYSICAL HEALTH: ON AVERAGE, HOW MANY MINUTES DO YOU ENGAGE IN EXERCISE AT THIS LEVEL?: 50 MIN

## 2024-12-17 ASSESSMENT — SOCIAL DETERMINANTS OF HEALTH (SDOH): HOW OFTEN DO YOU GET TOGETHER WITH FRIENDS OR RELATIVES?: ONCE A WEEK

## 2024-12-18 ENCOUNTER — OFFICE VISIT (OUTPATIENT)
Dept: FAMILY MEDICINE | Facility: CLINIC | Age: 54
End: 2024-12-18
Payer: COMMERCIAL

## 2024-12-18 VITALS
HEIGHT: 64 IN | OXYGEN SATURATION: 99 % | RESPIRATION RATE: 18 BRPM | BODY MASS INDEX: 22.18 KG/M2 | TEMPERATURE: 98.3 F | HEART RATE: 77 BPM | DIASTOLIC BLOOD PRESSURE: 78 MMHG | WEIGHT: 129.9 LBS | SYSTOLIC BLOOD PRESSURE: 115 MMHG

## 2024-12-18 DIAGNOSIS — R10.2 PELVIC PAIN IN FEMALE: ICD-10-CM

## 2024-12-18 DIAGNOSIS — F52.22 FEMALE SEXUAL AROUSAL DISORDER: ICD-10-CM

## 2024-12-18 DIAGNOSIS — K60.2 ANAL FISSURE: ICD-10-CM

## 2024-12-18 DIAGNOSIS — Z00.00 ROUTINE GENERAL MEDICAL EXAMINATION AT A HEALTH CARE FACILITY: Primary | ICD-10-CM

## 2024-12-18 RX ORDER — CYCLOBENZAPRINE HCL 5 MG
5 TABLET ORAL 3 TIMES DAILY PRN
Qty: 60 TABLET | Refills: 3 | Status: SHIPPED | OUTPATIENT
Start: 2024-12-18

## 2024-12-18 RX ORDER — GABAPENTIN 100 MG/1
100 CAPSULE ORAL
Qty: 90 CAPSULE | Refills: 1 | Status: SHIPPED | OUTPATIENT
Start: 2024-12-18

## 2024-12-18 RX ORDER — LIDOCAINE 50 MG/G
OINTMENT TOPICAL PRN
Qty: 50 G | Refills: 1 | Status: SHIPPED | OUTPATIENT
Start: 2024-12-18

## 2024-12-18 RX ORDER — LIDOCAINE 50 MG/G
OINTMENT TOPICAL PRN
COMMUNITY
End: 2024-12-18

## 2024-12-18 RX ORDER — ESTRADIOL 10 UG/1
INSERT VAGINAL
Qty: 24 TABLET | Refills: 3 | Status: SHIPPED | OUTPATIENT
Start: 2024-12-18 | End: 2025-03-12

## 2024-12-18 ASSESSMENT — PAIN SCALES - GENERAL: PAINLEVEL_OUTOF10: NO PAIN (0)

## 2024-12-18 NOTE — PROGRESS NOTES
Preventive Care Visit  [facility] DEVORAH Lemus MD, Family Medicine  Dec 18, 2024      Assessment & Plan     Routine general medical examination at a health care facility  Up to date. 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 Reviewed that may partially be due to her postmenopausal status, and her complicated relationship with her pelvic pain (which is much improved) but for which she has acknowledged PTSD-like feelings. Will trial topical estrogen for about a month and if not improved, to Jm romano and we can consider estrogen patch and then additional testosterone.  Recommended trial of vibrator, possibly one with vaginal insertion. Is thinking about it.  Also aware that we could refer her to our sexual health therapists.    - estradiol (VAGIFEM) 10 MCG TABS vaginal tablet; Place 1 tablet (10 mcg) vaginally daily for 14 days, THEN 1 tablet (10 mcg) twice a week.    Pelvic pain in female  Is trialing off this med for a week or so to see if affects her arousal. Is scared her pain will come back so being very careful with tapering.   - gabapentin (NEURONTIN) 100 MG capsule; Take 1 capsule (100 mg) by mouth nightly as needed for neuropathic pain. TAKE UP TO 2 CAPSULES BY MOUTH A DAY AS NEEDED AND 1 CAPSULE AT BEDTIME  - cyclobenzaprine (FLEXERIL) 5 MG tablet; Take 1 tablet (5 mg) by mouth 3 times daily as needed for muscle spasms.    Anal fissure  Seen by GI and treated with these. Refilled today.   - diltiazem 2% in PLO gel; Apply 30 clicks (7.5 g) topically 2 times daily.  - lidocaine (XYLOCAINE) 5 % external ointment; Apply topically as needed for moderate pain.            Counseling  Appropriate preventive services were addressed with this patient via screening, questionnaire, or discussion as appropriate for fall prevention, nutrition, physical activity, Tobacco-use cessation,  social engagement, weight loss and cognition.  Checklist reviewing preventive services available has been given to the patient.  Reviewed patient's diet, addressing concerns and/or questions.   She is at risk for psychosocial distress and has been provided with information to reduce risk.           No follow-ups on file.    Lukas Asif is a 54 year old, presenting for the following:  Physical (No real concerns)        12/18/2024     2:04 PM   Additional Questions   Roomed by Shruti   Accompanied by self         12/18/2024    Information    services provided? No             HPI    Sexual health:  Slight decrease in desire but still interested. Head is in the place where interested.   Used to have orgasms of 8/10 and now 2/10.  Orgasm - almost has to force it - can have it but the timing is off where she contracts first and then she feels good.   Change in the last 2 years.    Has not had intercourse for some time   Doesn't think she is dry.    Has not tried vibrator but has just ordered this.     Urinary symptoms - will have flair ups of her interstitial cystitis but at a very mild level. Occasional leaking with a sneeze.  When this happens feels like she has some vaginal irritation in the moment but very transient.   Stooling - is pretty regular. No accidents.      Hysterectomy - many years ago. No hot flashes.  2019 had high FSH.  Grandmother's sister only one with breast cancer.     Lumbar back -     Wanting lidocaine - for her internal hemmorhoid.   Is using a topical muscle relaxar that she puts in the pelvic area    UTI - no longer having these since 2019.    Also worried about the gabapentin causing this.  Is now on 9 days every other day.      The 10-year ASCVD risk score (Live TEIXEIRA, et al., 2019) is: 1.5%    Values used to calculate the score:      Age: 54 years      Sex: Female      Is Non- : No      Diabetic: No      Tobacco smoker: No      Systolic Blood  Pressure: 115 mmHg      Is BP treated: No      HDL Cholesterol: 74 mg/dL      Total Cholesterol: 215 mg/dL          Health Care Directive  Patient does not have a Health Care Directive: did not discuss today      12/17/2024   General Health   How would you rate your overall physical health? Good   Feel stress (tense, anxious, or unable to sleep) To some extent      (!) STRESS CONCERN      12/17/2024   Nutrition   Three or more servings of calcium each day? Yes   Diet: Low salt    Gluten-free/reduced   How many servings of fruit and vegetables per day? (!) 2-3   How many sweetened beverages each day? 0-1       Multiple values from one day are sorted in reverse-chronological order         12/17/2024   Exercise   Days per week of moderate/strenous exercise 6 days   Average minutes spent exercising at this level 50 min            12/17/2024   Social Factors   Frequency of gathering with friends or relatives Once a week   Worry food won't last until get money to buy more No   Food not last or not have enough money for food? No   Do you have housing? (Housing is defined as stable permanent housing and does not include staying ouside in a car, in a tent, in an abandoned building, in an overnight shelter, or couch-surfing.) Yes   Are you worried about losing your housing? No   Lack of transportation? No   Unable to get utilities (heat,electricity)? No            12/17/2024   Fall Risk   Fallen 2 or more times in the past year? No    Trouble with walking or balance? No        Patient-reported          12/17/2024   Dental   Dentist two times every year? Yes            12/17/2024   TB Screening   Were you born outside of the [place]? No            Today's PHQ-2 Score:       12/17/2024     7:47 PM   PHQ-2 ( 1999 [employer])   Q1: Little interest or pleasure in doing things 0    Q2: Feeling down, depressed or hopeless 0    PHQ-2 Score 0    Q1: Little interest or pleasure in doing things Not at all   Q2: Feeling down, depressed or  "hopeless Not at all   PHQ-2 Score 0       Patient-reported           2024   Substance Use   Alcohol more than 3/day or more than 7/wk No   Do you use any other substances recreationally? No        Social History     Tobacco Use    Smoking status: Former     Current packs/day: 0.00     Types: Cigarettes     Quit date: 10/4/2004     Years since quittin.2     Passive exposure: Past    Smokeless tobacco: Never   Vaping Use    Vaping status: Never Used   Substance Use Topics    Alcohol use: Yes     Comment: 2 drinks once a week    Drug use: No                  2024   STI Screening   New sexual partner(s) since last STI/HIV test? No        History of abnormal Pap smear: YES - reflected in Problem List and Health Maintenance accordingly        Latest Ref Rng & Units 2020    12:46 PM 2020    11:25 AM 2017     3:19 PM   PAP / HPV   PAP (Historical)   NIL  ASC-US    HPV 16 DNA NEG^Negative Negative   Negative    HPV 18 DNA NEG^Negative Negative   Negative    Other HR HPV NEG^Negative Negative   Negative      ASCVD Risk   The 10-year ASCVD risk score (Live TEIXEIRA, et al., 2019) is: 1.5%    Values used to calculate the score:      Age: 54 years      Sex: Female      Is Non- : No      Diabetic: No      Tobacco smoker: No      Systolic Blood Pressure: 115 mmHg      Is BP treated: No      HDL Cholesterol: 74 mg/dL      Total Cholesterol: 215 mg/dL           Reviewed and updated as needed this visit by Provider                             Objective    Exam  /78   Pulse 77   Temp 98.3  F (36.8  C) (Oral)   Resp 18   Ht 1.628 m (5' 4.09\")   Wt 58.9 kg (129 lb 14.4 oz)   LMP  (LMP Unknown)   SpO2 99%   BMI 22.23 kg/m     Estimated body mass index is 22.23 kg/m  as calculated from the following:    Height as of this encounter: 1.628 m (5' 4.09\").    Weight as of this encounter: 58.9 kg (129 lb 14.4 oz).    Physical Exam  GENERAL: alert and no distress  NECK: no " adenopathy, no asymmetry, masses, or scars  RESP: lungs clear to auscultation - no rales, rhonchi or wheezes  CV: regular rate and rhythm, normal S1 S2, no S3 or S4, no murmur, click or rub, no peripheral edema  ABDOMEN: soft, nontender, no hepatosplenomegaly, no masses and bowel sounds normal   (female): normal female external genitalia, normal urethral meatus, normal vaginal mucosa - has normal sensation  MS: no gross musculoskeletal defects noted, no edema        Signed Electronically by: Barb Lemus MD

## 2024-12-19 NOTE — PATIENT INSTRUCTIONS
Patient Education   Preventive Care Advice   This is general advice given by our system to help you stay healthy. However, your care team may have specific advice just for you. Please talk to your care team about your preventive care needs.  Nutrition  Eat 5 or more servings of fruits and vegetables each day.  Try wheat bread, brown rice and whole grain pasta (instead of white bread, rice, and pasta).  Get enough calcium and vitamin D. Check the label on foods and aim for 100% of the RDA (recommended daily allowance).  Lifestyle  Exercise at least 150 minutes each week  (30 minutes a day, 5 days a week).  Do muscle strengthening activities 2 days a week. These help control your weight and prevent disease.  No smoking.  Wear sunscreen to prevent skin cancer.  Have a dental exam and cleaning every 6 months.  Yearly exams  See your health care team every year to talk about:  Any changes in your health.  Any medicines your care team has prescribed.  Preventive care, family planning, and ways to prevent chronic diseases.  Shots (vaccines)   HPV shots (up to age 26), if you've never had them before.  Hepatitis B shots (up to age 59), if you've never had them before.  COVID-19 shot: Get this shot when it's due.  Flu shot: Get a flu shot every year.  Tetanus shot: Get a tetanus shot every 10 years.  Pneumococcal, hepatitis A, and RSV shots: Ask your care team if you need these based on your risk.  Shingles shot (for age 50 and up)  General health tests  Diabetes screening:  Starting at age 35, Get screened for diabetes at least every 3 years.  If you are younger than age 35, ask your care team if you should be screened for diabetes.  Cholesterol test: At age 39, start having a cholesterol test every 5 years, or more often if advised.  Bone density scan (DEXA): At age 50, ask your care team if you should have this scan for osteoporosis (brittle bones).  Hepatitis C: Get tested at least once in your life.  STIs (sexually  transmitted infections)  Before age 24: Ask your care team if you should be screened for STIs.  After age 24: Get screened for STIs if you're at risk. You are at risk for STIs (including HIV) if:  You are sexually active with more than one person.  You don't use condoms every time.  You or a partner was diagnosed with a sexually transmitted infection.  If you are at risk for HIV, ask about PrEP medicine to prevent HIV.  Get tested for HIV at least once in your life, whether you are at risk for HIV or not.  Cancer screening tests  Cervical cancer screening: If you have a cervix, begin getting regular cervical cancer screening tests starting at age 21.  Breast cancer scan (mammogram): If you've ever had breasts, begin having regular mammograms starting at age 40. This is a scan to check for breast cancer.  Colon cancer screening: It is important to start screening for colon cancer at age 45.  Have a colonoscopy test every 10 years (or more often if you're at risk) Or, ask your provider about stool tests like a FIT test every year or Cologuard test every 3 years.  To learn more about your testing options, visit:   .  For help making a decision, visit:   https://bit.ly/ag72367.  Prostate cancer screening test: If you have a prostate, ask your care team if a prostate cancer screening test (PSA) at age 55 is right for you.  Lung cancer screening: If you are a current or former smoker ages 50 to 80, ask your care team if ongoing lung cancer screenings are right for you.  For informational purposes only. Not to replace the advice of your health care provider. Copyright   2023 [facility]. All rights reserved. Clinically reviewed by the [facility] Transitions Program. GAM[employer] 543282 - REV 01/24.  Learning About Stress  What is stress?     Stress is your body's response to a hard situation. Your body can have a physical, emotional, or mental response. Stress is a fact of life for most people, and it  affects everyone differently. What causes stress for you may not be stressful for someone else.  A lot of things can cause stress. You may feel stress when you go on a job interview, take a test, or run a race. This kind of short-term stress is normal and even useful. It can help you if you need to work hard or react quickly. For example, stress can help you finish an important job on time.  Long-term stress is caused by ongoing stressful situations or events. Examples of long-term stress include long-term health problems, ongoing problems at work, or conflicts in your family. Long-term stress can harm your health.  How does stress affect your health?  When you are stressed, your body responds as though you are in danger. It makes hormones that speed up your heart, make you breathe faster, and give you a burst of energy. This is called the fight-or-flight stress response. If the stress is over quickly, your body goes back to normal and no harm is done.  But if stress happens too often or lasts too long, it can have bad effects. Long-term stress can make you more likely to get sick, and it can make symptoms of some diseases worse. If you tense up when you are stressed, you may develop neck, shoulder, or low back pain. Stress is linked to high blood pressure and heart disease.  Stress also harms your emotional health. It can make you reyes, tense, or depressed. Your relationships may suffer, and you may not do well at work or school.  What can you do to manage stress?  You can try these things to help manage stress:   Do something active. Exercise or activity can help reduce stress. Walking is a great way to get started. Even everyday activities such as housecleaning or yard work can help.  Try yoga or yamila chi. These techniques combine exercise and meditation. You may need some training at first to learn them.  Do something you enjoy. For example, listen to music or go to a movie. Practice your hobby or do volunteer  "work.  Meditate. This can help you relax, because you are not worrying about what happened before or what may happen in the future.  Do guided imagery. Imagine yourself in any setting that helps you feel calm. You can use online videos, books, or a teacher to guide you.  Do breathing exercises. For example:  From a standing position, bend forward from the waist with your knees slightly bent. Let your arms dangle close to the floor.  Breathe in slowly and deeply as you return to a standing position. Roll up slowly and lift your head last.  Hold your breath for just a few seconds in the standing position.  Breathe out slowly and bend forward from the waist.  Let your feelings out. Talk, laugh, cry, and express anger when you need to. Talking with supportive friends or family, a counselor, or a surjit leader about your feelings is a healthy way to relieve stress. Avoid discussing your feelings with people who make you feel worse.  Write. It may help to write about things that are bothering you. This helps you find out how much stress you feel and what is causing it. When you know this, you can find better ways to cope.  What can you do to prevent stress?  You might try some of these things to help prevent stress:  Manage your time. This helps you find time to do the things you want and need to do.  Get enough sleep. Your body recovers from the stresses of the day while you are sleeping.  Get support. Your family, friends, and community can make a difference in how you experience stress.  Limit your news feed. Avoid or limit time on social media or news that may make you feel stressed.  Do something active. Exercise or activity can help reduce stress. Walking is a great way to get started.  Where can you learn more?  Go to https://www.[employer].net/patiented  Enter N032 in the search box to learn more about \"Learning About Stress.\"  Current as of: October 24, 2023  Content Version: 14.3    2024 "[employer]. "   Care instructions adapted under license by your healthcare professional. If you have questions about a medical condition or this instruction, always ask your healthcare professional. [employer] disclaims any warranty or liability for your use of this information.

## 2025-01-27 DIAGNOSIS — R10.2 PELVIC PAIN IN FEMALE: ICD-10-CM

## 2025-01-27 RX ORDER — CYCLOBENZAPRINE HCL 5 MG
5 TABLET ORAL 3 TIMES DAILY PRN
Qty: 60 TABLET | Refills: 3 | Status: SHIPPED | OUTPATIENT
Start: 2025-01-27

## 2025-01-27 NOTE — TELEPHONE ENCOUNTER
"Request for medication refill:  cyclobenzaprine (FLEXERIL) 5 MG tablet   Providers if patient needs an appointment and you are willing to give a one month supply please refill for one month and  send a letter/MyChart using \".SMILLIMITEDREFILL\" .smillimited and route chart to \"P [facility] \" (Giving one month refill in non controlled medications is strongly recommended before denial)    If refill has been denied, meaning absolutely no refills without visit, please complete the smart phrase \".smirxrefuse\" and route it to the \"P [facility] MED REFILLS\"  pool to inform the patient and the pharmacy.    Rigo De La Cruz MA     "

## 2025-03-17 ENCOUNTER — DOCUMENTATION ONLY (OUTPATIENT)
Dept: FAMILY MEDICINE | Facility: CLINIC | Age: 55
End: 2025-03-17
Payer: COMMERCIAL

## 2025-03-17 DIAGNOSIS — R10.2 PELVIC PAIN IN FEMALE: ICD-10-CM

## 2025-03-17 NOTE — PROGRESS NOTES
"Request for medication refill:    Medication Name:     cyclobenzaprine (FLEXERIL) 5 MG tablet       Providers if patient needs an appointment and you are willing to give a one month supply please refill for one month and  send a MyChart using \".SMILLIMITEDREFILL\" .Or route chart to \"P [facility] \" . To call patient and inform of limited refill and providers request to make an appointment. (Giving one month refill in non controlled medications is strongly recommended before denial)    If refill has been denied, meaning absolutely no refills without visit, please complete the smart phrase \".SMIRXREFUSE\" and route it to the \"P [facility] MED REFILLS\"  pool to inform the patient and the pharmacy.    Rigo De La Cruz MA     "

## 2025-03-18 RX ORDER — CYCLOBENZAPRINE HCL 5 MG
5 TABLET ORAL 3 TIMES DAILY PRN
Qty: 60 TABLET | Refills: 3 | Status: SHIPPED | OUTPATIENT
Start: 2025-03-18

## 2025-07-28 ENCOUNTER — VIRTUAL VISIT (OUTPATIENT)
Dept: FAMILY MEDICINE | Facility: CLINIC | Age: 55
End: 2025-07-28
Payer: COMMERCIAL

## 2025-07-28 DIAGNOSIS — N30.10 INTERSTITIAL CYSTITIS: ICD-10-CM

## 2025-07-28 DIAGNOSIS — N39.0 RECURRENT UTI: Primary | ICD-10-CM

## 2025-07-28 DIAGNOSIS — T63.481D INSECT STINGS, ACCIDENTAL OR UNINTENTIONAL, SUBSEQUENT ENCOUNTER: ICD-10-CM

## 2025-07-28 NOTE — PROGRESS NOTES
Laya is a 55 year old who is being evaluated via a billable video visit.          Assessment & Plan     Recurrent UTI  Is feeling much better but not fully; Will give it a few more days and if not feeling back to normal, will come in for lab only visit for UA. Order placed  - UA Macroscopic with reflex to Microscopic and Culture; Future    Interstitial cystitis  She had a flare with her recent antibiotic use, but is again doing much better with exercise and some dietary changes    Insect stings, accidental or unintentional, subsequent encounter  Reviewed that she had a Large Local Reaction to a wasp sting.  Reviewed that she has a relatively low rate of her condition to progress to full anaphylaxis but could happen, particularly with multiple stings, and that I could refer her to allergy for venom therapy if desires. She rather not at this point. Is aware of using anti histamines with a sting and that higher dose steroid topicals if needed. Aware when to call emergently.         Subjective   Laya is a 55 year old, presenting for the following health issues:  hormone (Concerned concerns and a wasp -  wrist - not hurting as of today!)      7/28/2025    10:30 AM   Additional Questions   Roomed by Shruti   Accompanied by self         7/28/2025    Information    services provided? No     HPI      Large reaction, took 4 days to get better on the Prednisone.  Wasp sting.   Never had systemic symptoms or throat, lip changes.     Given Keflex  - gave her the worst yeast infection every and treated it with the one day treatment of the Monistat. Then ended up with terrible IC that lasted 24 hours.     Hormone questions:  Was doing the vaginal estrogen that made her excessively moist.  And so will no longer use it.   Can now enjoy in sexual activity - finds that she is very itchy and inflamed the next day.    Is toying with the patch - knows she runs the risk that she should not get on it.   Thinks her  menopause was in the early 50s but had a hysterectomy so not able to fully tell.   Drive still not there.    Is also doing really well with her IC - thinks that aged cheese is a cause.   Better with exercise.     Had a raging UTI recently - took 5 days of Macrobid and is feeling OK.  Today better than yesterday.  Wondering if she could come in for UA if needs to be.                   Objective           Vitals:  No vitals were obtained today due to virtual visit.    Physical Exam   GENERAL: alert and no distress  EYES: Eyes grossly normal to inspection.  No discharge or erythema, or obvious scleral/conjunctival abnormalities.  RESP: No audible wheeze, cough, or visible cyanosis.    SKIN: Visible skin clear. No significant rash, abnormal pigmentation or lesions.          Video-Visit Details    Type of service:  Video Visit   Originating Location (pt. Location): Home    Distant Location (provider location):  On-site  Platform used for Video Visit: Rylan  Signed Electronically by: Barb Lemus MD

## 2025-07-29 ENCOUNTER — OFFICE VISIT (OUTPATIENT)
Dept: FAMILY MEDICINE | Facility: CLINIC | Age: 55
End: 2025-07-29
Payer: COMMERCIAL

## 2025-07-29 ENCOUNTER — LAB (OUTPATIENT)
Dept: LAB | Facility: CLINIC | Age: 55
End: 2025-07-29
Payer: COMMERCIAL

## 2025-07-29 VITALS
SYSTOLIC BLOOD PRESSURE: 119 MMHG | RESPIRATION RATE: 15 BRPM | DIASTOLIC BLOOD PRESSURE: 77 MMHG | OXYGEN SATURATION: 100 % | BODY MASS INDEX: 22.3 KG/M2 | TEMPERATURE: 97.8 F | HEIGHT: 64 IN | HEART RATE: 59 BPM

## 2025-07-29 DIAGNOSIS — N30.10 INTERSTITIAL CYSTITIS: ICD-10-CM

## 2025-07-29 DIAGNOSIS — R30.0 DYSURIA: Primary | ICD-10-CM

## 2025-07-29 DIAGNOSIS — N39.0 RECURRENT UTI: ICD-10-CM

## 2025-07-29 LAB
ALBUMIN UR-MCNC: NEGATIVE MG/DL
APPEARANCE UR: ABNORMAL
BACTERIA #/AREA URNS HPF: ABNORMAL /HPF
BILIRUB UR QL STRIP: NEGATIVE
COLOR UR AUTO: ABNORMAL
GLUCOSE UR STRIP-MCNC: NEGATIVE MG/DL
HGB UR QL STRIP: ABNORMAL
KETONES UR STRIP-MCNC: NEGATIVE MG/DL
LEUKOCYTE ESTERASE UR QL STRIP: ABNORMAL
NITRATE UR QL: NEGATIVE
PH UR STRIP: 7 [PH] (ref 5–8)
RBC #/AREA URNS AUTO: ABNORMAL /HPF
SP GR UR STRIP: 1.01 (ref 1–1.03)
SQUAMOUS #/AREA URNS AUTO: ABNORMAL /LPF
UROBILINOGEN UR STRIP-ACNC: 0.2 E.U./DL
WBC #/AREA URNS AUTO: ABNORMAL /HPF

## 2025-07-29 PROCEDURE — 81001 URINALYSIS AUTO W/SCOPE: CPT

## 2025-07-29 PROCEDURE — 87086 URINE CULTURE/COLONY COUNT: CPT

## 2025-07-29 PROCEDURE — 87088 URINE BACTERIA CULTURE: CPT

## 2025-07-29 PROCEDURE — 87186 SC STD MICRODIL/AGAR DIL: CPT

## 2025-07-29 RX ORDER — PHENAZOPYRIDINE HYDROCHLORIDE 100 MG/1
100 TABLET, FILM COATED ORAL 3 TIMES DAILY PRN
Qty: 30 TABLET | Refills: 0 | Status: SHIPPED | OUTPATIENT
Start: 2025-07-29

## 2025-07-29 RX ORDER — SULFAMETHOXAZOLE AND TRIMETHOPRIM 800; 160 MG/1; MG/1
1 TABLET ORAL 2 TIMES DAILY
Qty: 6 TABLET | Refills: 0 | Status: SHIPPED | OUTPATIENT
Start: 2025-07-29 | End: 2025-08-01

## 2025-07-29 NOTE — PROGRESS NOTES
Assessment & Plan     Dysuria  Interstitial cystitis  Patient reports recurrence of symptoms within a day of completing recent course of macrobid. I strongly advised patient to pursue symptomatic treatment and await urine culture results but patient strongly desires antibiotics. We discussed the risks of antibiotics including antibiotic resistance and c diff colitis, patient verbalized understanding but desires antibiotics. I also briefly discussed vaginal estrogen, she says that she was worried about vaginal estrogen treatment interstitial cystitis so she did not use the medication.  She is willing to reconsider this.  If urine culture returns normal, recommend referral to urology.  She will consider this as well.  - sulfamethoxazole-trimethoprim (BACTRIM DS) 800-160 MG tablet; Take 1 tablet by mouth 2 times daily for 3 days.  - phenazopyridine (PYRIDIUM) 100 MG tablet; Take 1 tablet (100 mg) by mouth 3 times daily as needed for urinary tract discomfort.    Follow-up  Return if symptoms worsen or fail to improve, for Follow up.    Lukas Asif is a 55 year old, presenting for the following health issues:  UTI        7/29/2025    10:15 AM   Additional Questions   Roomed by Xu   Accompanied by Self         7/29/2025   Declines Weight   Did patient decline having their weight taken? Yes         7/29/2025    10:15 AM   Patient Reported Additional Medications   Patient reports taking the following new medications n/a         7/29/2025    Information    services provided? No     HPI      Symptoms started a week ago, cloudy urine, bladder pain, foul smelling urine. Never had this before. No fever. No hematuria. Is sexually active. No concerns for STI.    Antibiotic was prescribed on 07/22/25, was prescribed macrobid and took 5 days of antibiotics. No missed doses. Still had some symptoms on Sunday. She was seen by Dr. Lemus had repeat urine test today. Had symptoms again last night.  "    Has interstitial cystitis, does not want inflammation to trigger. Was prescribed antibiotics for a wasp bite, was prescribed keflex.      3 antibiotics in the last 3 months. Before this 2020 had last antibiotics.     Dr. Lemus diagnosed with interstitial cystitis.     No smoking. Drinks about 5 drinks a week.  Menopause was in early 50's. No bleeding or spot, hysterectomy at 38.           Objective    /77   Pulse 59   Temp 97.8  F (36.6  C) (Temporal)   Resp 15   Ht 1.626 m (5' 4\")   LMP  (LMP Unknown)   SpO2 100%   BMI 22.30 kg/m    Body mass index is 22.3 kg/m .  Physical Exam  Constitutional:       Appearance: Normal appearance.   HENT:      Head: Normocephalic and atraumatic.      Right Ear: External ear normal.      Left Ear: External ear normal.   Eyes:      Extraocular Movements: Extraocular movements intact.      Conjunctiva/sclera: Conjunctivae normal.   Cardiovascular:      Rate and Rhythm: Normal rate.   Pulmonary:      Effort: Pulmonary effort is normal.   Abdominal:      Palpations: Abdomen is soft.      Tenderness: There is abdominal tenderness in the suprapubic area. There is no guarding or rebound.   Musculoskeletal:      Cervical back: Normal range of motion.   Neurological:      General: No focal deficit present.      Mental Status: She is alert.   Psychiatric:         Mood and Affect: Mood normal.         Thought Content: Thought content normal.                Signed Electronically by: Isabella José MD    "

## 2025-07-30 LAB — BACTERIA UR CULT: ABNORMAL

## (undated) DEVICE — KIT ENDO TURNOVER/PROCEDURE W/CLEAN A SCOPE LINERS 103888

## (undated) RX ORDER — SIMETHICONE 40MG/0.6ML
SUSPENSION, DROPS(FINAL DOSAGE FORM)(ML) ORAL
Status: DISPENSED
Start: 2022-03-29

## (undated) RX ORDER — FENTANYL CITRATE 0.05 MG/ML
INJECTION, SOLUTION INTRAMUSCULAR; INTRAVENOUS
Status: DISPENSED
Start: 2022-03-29